# Patient Record
Sex: FEMALE | Race: ASIAN | NOT HISPANIC OR LATINO | Employment: FULL TIME | URBAN - METROPOLITAN AREA
[De-identification: names, ages, dates, MRNs, and addresses within clinical notes are randomized per-mention and may not be internally consistent; named-entity substitution may affect disease eponyms.]

---

## 2017-02-09 ENCOUNTER — PRE VISIT (OUTPATIENT)
Dept: ORTHOPEDICS | Facility: CLINIC | Age: 57
End: 2017-02-09

## 2017-02-09 NOTE — TELEPHONE ENCOUNTER
1.  Date/reason for appt: 2/17/17 -- bilateral numbing in feet  2.  Referring provider: Kasey Stanton  3.  Call to patient (Yes / No - short description): no, referred  4.  Previous care at / records requested from: [facility] -- faxed records request.  5.  Other: [facility] -- pt has seen Dr. Fonseca previously, last seen 6/10/15.

## 2017-02-17 ENCOUNTER — OFFICE VISIT (OUTPATIENT)
Dept: ORTHOPEDICS | Facility: CLINIC | Age: 57
End: 2017-02-17

## 2017-02-17 VITALS — WEIGHT: 104.2 LBS | HEIGHT: 62 IN | BODY MASS INDEX: 19.17 KG/M2

## 2017-02-17 DIAGNOSIS — E11.628 TYPE 2 DIABETES MELLITUS WITH OTHER SKIN COMPLICATION, WITHOUT LONG-TERM CURRENT USE OF INSULIN (H): Primary | ICD-10-CM

## 2017-02-17 DIAGNOSIS — I73.9 PVD (PERIPHERAL VASCULAR DISEASE) (H): ICD-10-CM

## 2017-02-17 DIAGNOSIS — L84 TYLOMA: ICD-10-CM

## 2017-02-17 DIAGNOSIS — B35.1 DERMATOPHYTOSIS OF NAIL: ICD-10-CM

## 2017-02-17 DIAGNOSIS — M20.42 HAMMER TOES OF BOTH FEET: ICD-10-CM

## 2017-02-17 DIAGNOSIS — M20.41 HAMMER TOES OF BOTH FEET: ICD-10-CM

## 2017-02-17 DIAGNOSIS — Q66.6 PES PLANOVALGUS: ICD-10-CM

## 2017-02-17 NOTE — MR AVS SNAPSHOT
After Visit Summary   2/17/2017    Lucia Oliver    MRN: [id number]           Patient Information     Date Of Birth          1960        Visit Information        Provider Department      2/17/2017 2:25 PM Rogers Ivory Aaron Daniel, DPM [facility]         Follow-ups after your visit        Your next 10 appointments already scheduled     Apr 03, 2017  3:15 PM CDT   RETURN GENERAL with Mary Collazo MD   Eye Clinic ([facility])    [facility]  [address]  [address] [address]  [facility] [address] [phone]            Jun 20, 2017  4:20 PM CDT   (Arrive by 4:05 PM)   RETURN FOOT/ANKLE with RONEY Silver [facility] ([facility])    [address]  [address] [facility] [id number] [phone]              Who to contact     Please call your clinic at [phone] to:    Ask questions about your health    Make or cancel appointments    Discuss your medicines    Learn about your test results    Speak to your doctor   If you have compliments or concerns about an experience at your clinic, or if you wish to file a complaint, please contact [facility] Patient Relations at [phone] or email us at [email or web address]         Additional Information About Your Visit        MyChart Information     Efficient Front[employer] is an electronic gateway that provides easy, online access to your medical records. With MyAGENT, you can request a clinic appointment, read your test results, renew a prescription or communicate with your care team.     To sign up for Efficient [employer] visit the website at [email or web address]   You will be asked to enter the access code listed below, as well as some personal information. Please follow the directions to create your username and password.     Your access code is: [email or web address]  Expires: 5/11/2017  6:30 AM     Your access code will  " in 90 days. If you need help or a new code, please contact your [facility] or call [phone] for assistance.        Care EveryWhere ID     This is your Care EveryWhere ID. This could be used by other organizations to access your [place] medical records  [id number]        Your Vitals Were     Height BMI (Body Mass Index)                1.575 m (5' 2\") 19.06 kg/m2           Blood Pressure from Last 3 Encounters:   14 112/72   14 116/76   14 112/68    Weight from Last 3 Encounters:   17 47.3 kg (104 lb 3.2 oz)   06/10/15 48.1 kg (106 lb)   14 47.6 kg (105 lb)              Today, you had the following     No orders found for display       Primary Care Provider Office Phone # Fax #    Daniela Fishgretaharry [phone]       [facility]  [facility] [address]        Thank you!     Thank you for choosing [facility]  for your care. Our goal is always to provide you with excellent care. Hearing back from our patients is one way we can continue to improve our services. Please take a few minutes to complete the written survey that you may receive in the mail after your visit with us. Thank you!             Your Updated Medication List - Protect others around you: Learn how to safely use, store and throw away your medicines at www.disposemymeds.org.          This list is accurate as of: 17  3:28 PM.  Always use your most recent med list.                   Brand Name Dispense Instructions for use    econazole nitrate 1 % cream     85 g    Apply  topically 2 times daily.       Efinaconazole 10 % Soln     8 mL    Externally apply topically daily To toenails daily.       FOSAMAX 70 MG tablet   Generic drug:  alendronate      Take 70 mg by mouth every 7 days       glyBURIDE 5 MG tablet    DIABETA /MICRONASE     Take 5 mg by mouth daily (with breakfast).       lisinopril 20 MG tablet    PRINIVIL/ZESTRIL     Take " 20 mg by mouth daily.       MAXAIR AUTOHALER 200 MCG/INH Inhaler   Generic drug:  pirbuterol      Inhale 2 puffs into the lungs 4 times daily.       QVAR 80 MCG/ACT Inhaler   Generic drug:  beclomethasone      Inhale 1 puff into the lungs 2 times daily.       simvastatin 20 MG tablet    ZOCOR     Take  by mouth At Bedtime.

## 2017-02-17 NOTE — LETTER
2/17/2017       RE: Lucia Oliver  [address]  [place] [address]     Dear Colleague,    Thank you for referring your patient, Lucia Oliver, to the [facility] at [facility]. Please see a copy of my visit note below.    Date of Service: 2/17/2017    Chief Complaint:   Chief Complaint   Patient presents with     Foot Problems     bilateral feet numbness and c/o callous formation        HPI: Lucia is a 56 year old female who presents today for further evaluation of calluses on both of her feet, thickened toenails, and new diabetic shoes. She relates that the calluses on both of her feet are painful when she is standing at work. She relates that the nails are very thick. She has been using vinegar soaks everyday to control the thickness. She would also like a new pair of diabetic shoes. Her last pair was made in 2015. She does relates that she does sometimes get numbness on the sides of her feet that radiate up her leg. She notes that this decreases when she is in her diabetic shoes. She presents today with an . Pt was seen by PMD 2/8/17 at [facility]    Review of Systems: No N/V/D/F/C/SOB/CP  Answers for HPI/ROS submitted by the patient on 2/17/2017   General Symptoms: No  Skin Symptoms: No      PMH:   Past Medical History   Diagnosis Date     Asthma      Cataract      Diabetes mellitus type II      Essential hypertension, benign      High cholesterol      Numbness in both hands      Unspecified asthma(493.90)        PSxH: No past surgical history on file.    Allergies: Advil [ibuprofen]    SH:   Social History     Social History     Marital status:      Spouse name: N/A     Number of children: N/A     Years of education: N/A     Occupational History     Not on file.     Social History Main Topics     Smoking status: Never Smoker     Smokeless tobacco: Never Used     Alcohol use Not on file     Drug use: Not on file     Sexual activity: Not on file  "    Other Topics Concern     Not on file     Social History Narrative       FH:   Family History   Problem Relation Age of Onset     Glaucoma No family hx of      Macular Degeneration No family hx of        Objective:  Data Unavailable Data Unavailable Data Unavailable Data Unavailable 5' 2\" 104 lbs 3.2 oz     Lab Results   Component Value Date    A1C 5.6 09/26/2005    A1C 6.3 04/20/2005         PT and DP pulses are 1/4 bilaterally. CRT is >3 seconds. Diminished pedal hair.   Gross sensation is intact bilaterally. Protective sensation is intact as demonstrated with the [place]  Touch test. Paresthesia not reproducible today. No Tinel's sign.   Equinus is noted bilaterally. No pain with active or passive ROM of the ankle, MTJ, 1st ray, or halluces bilaterally. Pes planus BL. Hammertoes BL  Nails thickened, yellow, brittle, with subungual debris bilaterally at the halluces, 2nd, and 5th digits. Remaining nails are dystrophic. No open lesions are noted.     Assessment: DM2  PVD  Onychomycosis x 6. Dystrophic nails x 4  Tyloma  Paresthesia      Plan:  - Pt seen and evaluated. Unclear etiology of the paresthesia. Could be coming from back as she relates it radiates up the leg. She declined referral to back ortho today.   - Nails were debrided x 6.   - Tylomas debrided x 2  - Rx for diabetic shoes was written. She will call to make appointment.   - See again in 4 months.    Again, thank you for allowing me to participate in the care of your patient.      Sincerely,    Dimitri aSnchez, RONEY      "

## 2017-02-17 NOTE — PROGRESS NOTES
Date of Service: 2/17/2017    Chief Complaint:   Chief Complaint   Patient presents with     Foot Problems     bilateral feet numbness and c/o callous formation        HPI: Lucia is a 56 year old female who presents today for further evaluation of calluses on both of her feet, thickened toenails, and new diabetic shoes. She relates that the calluses on both of her feet are painful when she is standing at work. She relates that the nails are very thick. She has been using vinegar soaks everyday to control the thickness. She would also like a new pair of diabetic shoes. Her last pair was made in 2015. She does relates that she does sometimes get numbness on the sides of her feet that radiate up her leg. She notes that this decreases when she is in her diabetic shoes. She presents today with an . Pt was seen by PMD 2/8/17 at [facility]    Review of Systems: No N/V/D/F/C/SOB/CP  Answers for HPI/ROS submitted by the patient on 2/17/2017   General Symptoms: No  Skin Symptoms: No      PMH:   Past Medical History   Diagnosis Date     Asthma      Cataract      Diabetes mellitus type II      Essential hypertension, benign      High cholesterol      Numbness in both hands      Unspecified asthma(493.90)        PSxH: No past surgical history on file.    Allergies: Advil [ibuprofen]    SH:   Social History     Social History     Marital status:      Spouse name: N/A     Number of children: N/A     Years of education: N/A     Occupational History     Not on file.     Social History Main Topics     Smoking status: Never Smoker     Smokeless tobacco: Never Used     Alcohol use Not on file     Drug use: Not on file     Sexual activity: Not on file     Other Topics Concern     Not on file     Social History Narrative       FH:   Family History   Problem Relation Age of Onset     Glaucoma No family hx of      Macular Degeneration No family hx of        Objective:  Data Unavailable Data Unavailable Data Unavailable Data  "Unavailable 5' 2\" 104 lbs 3.2 oz     Lab Results   Component Value Date    A1C 5.6 09/26/2005    A1C 6.3 04/20/2005         PT and DP pulses are 1/4 bilaterally. CRT is >3 seconds. Diminished pedal hair.   Gross sensation is intact bilaterally. Protective sensation is intact as demonstrated with the [place]  Touch test. Paresthesia not reproducible today. No Tinel's sign.   Equinus is noted bilaterally. No pain with active or passive ROM of the ankle, MTJ, 1st ray, or halluces bilaterally. Pes planus BL. Hammertoes BL  Nails thickened, yellow, brittle, with subungual debris bilaterally at the halluces, 2nd, and 5th digits. Remaining nails are dystrophic. No open lesions are noted.     Assessment: DM2  PVD  Onychomycosis x 6. Dystrophic nails x 4  Tyloma  Paresthesia      Plan:  - Pt seen and evaluated. Unclear etiology of the paresthesia. Could be coming from back as she relates it radiates up the leg. She declined referral to back ortho today.   - Nails were debrided x 6.   - Tylomas debrided x 2  - Rx for diabetic shoes was written. She will call to make appointment.   - See again in 4 months.             "

## 2017-02-17 NOTE — NURSING NOTE
"Reason For Visit:   Chief Complaint   Patient presents with     Foot Problems     bilateral feet numbness and c/o callous formation       Primary MD: Daniela Hernandez  Ref. MD: Kasey Stanton    ?  Yes, specify language: Mizzen+Main  Occupation [employer].  Currently working? Yes.  Work status?  Full time.  Date of injury: none  Date of surgery: none  Smoker: No      Ht 1.575 m (5' 2\")  Wt 47.3 kg (104 lb 3.2 oz)  BMI 19.06 kg/m2    Pain Assessment  Patient Currently in Pain: No      "

## 2017-04-03 ENCOUNTER — OFFICE VISIT (OUTPATIENT)
Dept: OPHTHALMOLOGY | Facility: CLINIC | Age: 57
End: 2017-04-03
Attending: OPHTHALMOLOGY
Payer: COMMERCIAL

## 2017-04-03 DIAGNOSIS — H25.13 SENILE NUCLEAR SCLEROSIS, BILATERAL: ICD-10-CM

## 2017-04-03 DIAGNOSIS — H52.13 MYOPIA WITH ASTIGMATISM AND PRESBYOPIA, BILATERAL: ICD-10-CM

## 2017-04-03 DIAGNOSIS — E11.9 TYPE 2 DIABETES MELLITUS WITHOUT COMPLICATION, WITHOUT LONG-TERM CURRENT USE OF INSULIN (H): Primary | ICD-10-CM

## 2017-04-03 DIAGNOSIS — H52.203 MYOPIA WITH ASTIGMATISM AND PRESBYOPIA, BILATERAL: ICD-10-CM

## 2017-04-03 DIAGNOSIS — H52.4 MYOPIA WITH ASTIGMATISM AND PRESBYOPIA, BILATERAL: ICD-10-CM

## 2017-04-03 DIAGNOSIS — H11.132: ICD-10-CM

## 2017-04-03 PROCEDURE — 99213 OFFICE O/P EST LOW 20 MIN: CPT | Mod: ZF

## 2017-04-03 PROCEDURE — 92015 DETERMINE REFRACTIVE STATE: CPT | Mod: ZF

## 2017-04-03 PROCEDURE — 92285 EXTERNAL OCULAR PHOTOGRAPHY: CPT | Mod: ZF | Performed by: OPHTHALMOLOGY

## 2017-04-03 ASSESSMENT — REFRACTION_MANIFEST
OD_AXIS: 160
OD_ADD: +1.75
OD_SPHERE: -0.25
OS_ADD: +1.75
OD_CYLINDER: +0.25
OS_SPHERE: -1.00
OS_CYLINDER: +1.00
OS_AXIS: 165

## 2017-04-03 ASSESSMENT — EXTERNAL EXAM - RIGHT EYE: OD_EXAM: NORMAL

## 2017-04-03 ASSESSMENT — CONF VISUAL FIELD
OD_NORMAL: 1
OS_NORMAL: 1
METHOD: COUNTING FINGERS

## 2017-04-03 ASSESSMENT — CUP TO DISC RATIO
OD_RATIO: 0.4
OS_RATIO: 0.4

## 2017-04-03 ASSESSMENT — TONOMETRY
OD_IOP_MMHG: 14
IOP_METHOD: TONOPEN
OS_IOP_MMHG: 15

## 2017-04-03 ASSESSMENT — VISUAL ACUITY
OD_CC: 20/25
OS_CC: 20/25
METHOD: SNELLEN - LINEAR
OS_CC+: -1

## 2017-04-03 ASSESSMENT — SLIT LAMP EXAM - LIDS
COMMENTS: NORMAL
COMMENTS: NORMAL

## 2017-04-03 ASSESSMENT — EXTERNAL EXAM - LEFT EYE: OS_EXAM: NORMAL

## 2017-04-03 NOTE — NURSING NOTE
Chief Complaints and History of Present Illnesses   Patient presents with     Diabetic Eye Exam     s/p MD Type 2 diabetes mellitus without complication (HCC) -       HPI    Affected eye(s):  Both   Symptoms:     Decreased vision   No floaters   No flashes   No halos   No starbursts   No tearing   No Dryness      Duration:  12 months   Frequency:  Constant       Do you have eye pain now?:  No      Comments:  Pt. stated a decrease in near vision over the last 4 months.   BS:130. She does not check daily.  A1C 5.9 03/2015 per pt            A1C                      5.6                 09/26/2005                 A1C                      6.3                 04/20/2005              Joaquim Sharp  3:14 PM April 3, 2017

## 2017-04-03 NOTE — PROGRESS NOTES
HPI  Lucia Oliver is a 56year old female here for diabetic eye exam. She states her vision is decreased at near in both eyes for the last year or so. Her distance vision is good without glasses. No eye pain, redness, discharge. No flashes/floaters. Per patient, her last Hgb A1C 5.9     Assessment & Plan      (E11.9) Type 2 diabetes mellitus without complication (HCC) - Both Eyes  (primary encounter diagnosis)  Comment: On glyburide. Patient reported A1c 5.9% few weeks ago. No diabetic retinopathy on exam today.  Plan: Discussed the importance of tight blood glucose control in the prevention of diabetic retinopathy. Recommend yearly dilated eye exam.    (H25.13) Senile nuclear sclerosis, bilateral - Both Eyes  Comment: Mild, good best corrected visual acuity with updated manifest refraction.   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       -----------------------------------------------------------------------------------    Patient disposition:   Return in about 1 year (around 4/3/2018). or sooner as needed.      Helio Stewart MD  PGY2, Dept of Ophthalmology  Pager [phone]    Teaching statement:  Complete documentation of historical and exam elements from today's encounter can be found in the full encounter summary report (not reduplicated in this progress note). I personally obtained the chief complaint(s) and history of present illness.  I confirmed and edited as necessary the review of systems, past medical/surgical history, family history, social history, and examination findings as documented by others; and I examined the patient myself. I personally reviewed the relevant tests, images, and reports as documented  above.     I formulated and edited as necessary the assessment and plan and discussed the findings and management plan with the patient and family.    Mary Collazo MD  Comprehensive Ophthalmology & Ocular Pathology  Department of Ophthalmology and Visual Neurosciences  [email or web address]  Pager [phone]

## 2017-04-03 NOTE — MR AVS SNAPSHOT
After Visit Summary   4/3/2017    Lucia Oliver    MRN: [id number]           Patient Information     Date Of Birth          1960        Visit Information        Provider Department      4/3/2017 3:00 PM Mary Collazo MD; [facility]        Today's Diagnoses     Type 2 diabetes mellitus without complication, without long-term current use of insulin (H)    -  1    Senile nuclear sclerosis, bilateral - Both Eyes        Myopia with astigmatism and presbyopia, bilateral - Both Eyes        Conjunctival pigmentations, left           Follow-ups after your visit        Follow-up notes from your care team     Return in about 1 year (around 4/3/2018).      Your next 10 appointments already scheduled     Jun 20, 2017  4:20 PM CDT   (Arrive by 4:05 PM)   RETURN FOOT/ANKLE with Dimitri Sanchez DPM   [facility] ([facility])    [address] [place] [address] [phone]              Who to contact     Please call your clinic at [phone] to:    Ask questions about your health    Make or cancel appointments    Discuss your medicines    Learn about your test results    Speak to your doctor   If you have compliments or concerns about an experience at your clinic, or if you wish to file a complaint, please contact [facility] Patient Relations at [phone] or email us at [email or web address]         Additional Information About Your Visit        MyChart Information     Way2Payt is an electronic gateway that provides easy, online access to your medical records. With [employer], you can request a clinic appointment, read your test results, renew a prescription or communicate with your care team.     To sign up for Way2Payt visit the website at [email or web address]   You will be asked to enter the access code listed below, as well as some personal information. Please  follow the directions to create your username and password.     Your access code is: [email or web address]  Expires: 2017  7:30 AM     Your access code will  in 90 days. If you need help or a new code, please contact your [facility] or call [phone] for assistance.        Care EveryWhere ID     This is your Care EveryWhere ID. This could be used by other organizations to access your [place] medical records  [id number]         Blood Pressure from Last 3 Encounters:   14 112/72   14 116/76   14 112/68    Weight from Last 3 Encounters:   17 47.3 kg (104 lb 3.2 oz)   06/10/15 48.1 kg (106 lb)   14 47.6 kg (105 lb)              We Performed the Following     Slit Lamp Photos OS (left eye)        Primary Care Provider Office Phone # Fax #    Daniela TITA Hernandez [phone]       [facility]  [facility] [address]        Thank you!     Thank you for choosing EYE CLINIC  for your care. Our goal is always to provide you with excellent care. Hearing back from our patients is one way we can continue to improve our services. Please take a few minutes to complete the written survey that you may receive in the mail after your visit with us. Thank you!             Your Updated Medication List - Protect others around you: Learn how to safely use, store and throw away your medicines at www.disposemymeds.org.          This list is accurate as of: 4/3/17  4:45 PM.  Always use your most recent med list.                   Brand Name Dispense Instructions for use    econazole nitrate 1 % cream     85 g    Apply  topically 2 times daily.       Efinaconazole 10 % Soln     8 mL    Externally apply topically daily To toenails daily.       FOSAMAX 70 MG tablet   Generic drug:  alendronate      Take 70 mg by mouth every 7 days       glyBURIDE 5 MG tablet    DIABETA /MICRONASE     Take 5 mg by mouth daily (with breakfast).       lisinopril 20 MG  tablet    PRINIVIL/ZESTRIL     Take 20 mg by mouth daily.       MAXAIR AUTOHALER 200 MCG/INH Inhaler   Generic drug:  pirbuterol      Inhale 2 puffs into the lungs 4 times daily.       QVAR 80 MCG/ACT Inhaler   Generic drug:  beclomethasone      Inhale 1 puff into the lungs 2 times daily.       simvastatin 20 MG tablet    ZOCOR     Take  by mouth At Bedtime.

## 2017-09-20 ENCOUNTER — MEDICAL CORRESPONDENCE (OUTPATIENT)
Dept: HEALTH INFORMATION MANAGEMENT | Facility: CLINIC | Age: 57
End: 2017-09-20

## 2018-03-19 ENCOUNTER — MEDICAL CORRESPONDENCE (OUTPATIENT)
Dept: HEALTH INFORMATION MANAGEMENT | Facility: CLINIC | Age: 58
End: 2018-03-19

## 2018-04-09 ENCOUNTER — OFFICE VISIT (OUTPATIENT)
Dept: OPHTHALMOLOGY | Facility: CLINIC | Age: 58
End: 2018-04-09
Attending: OPHTHALMOLOGY
Payer: COMMERCIAL

## 2018-04-09 DIAGNOSIS — H52.203 MYOPIA WITH ASTIGMATISM AND PRESBYOPIA, BILATERAL: ICD-10-CM

## 2018-04-09 DIAGNOSIS — H52.13 MYOPIA WITH ASTIGMATISM AND PRESBYOPIA, BILATERAL: ICD-10-CM

## 2018-04-09 DIAGNOSIS — H25.13 NUCLEAR SCLEROTIC CATARACT OF BOTH EYES: ICD-10-CM

## 2018-04-09 DIAGNOSIS — H11.132: ICD-10-CM

## 2018-04-09 DIAGNOSIS — H52.4 MYOPIA WITH ASTIGMATISM AND PRESBYOPIA, BILATERAL: ICD-10-CM

## 2018-04-09 DIAGNOSIS — E11.9 DIABETES MELLITUS TYPE 2 WITHOUT RETINOPATHY (H): Primary | ICD-10-CM

## 2018-04-09 PROCEDURE — 92015 DETERMINE REFRACTIVE STATE: CPT | Mod: ZF

## 2018-04-09 PROCEDURE — G0463 HOSPITAL OUTPT CLINIC VISIT: HCPCS | Mod: ZF

## 2018-04-09 ASSESSMENT — REFRACTION_MANIFEST
OD_ADD: +2.50
OD_AXIS: 175
OD_CYLINDER: +0.50
OS_ADD: +2.50
OS_AXIS: 173
OS_SPHERE: -0.75
OD_SPHERE: -0.25
OS_CYLINDER: +1.25

## 2018-04-09 ASSESSMENT — CUP TO DISC RATIO
OS_RATIO: 0.4
OD_RATIO: 0.4

## 2018-04-09 ASSESSMENT — TONOMETRY
OS_IOP_MMHG: 12
IOP_METHOD: ICARE
OD_IOP_MMHG: 13

## 2018-04-09 ASSESSMENT — EXTERNAL EXAM - LEFT EYE: OS_EXAM: NORMAL

## 2018-04-09 ASSESSMENT — VISUAL ACUITY
OD_SC: 20/25
METHOD: SNELLEN - LINEAR
OD_SC+: +1
OS_SC: 20/40
OS_SC+: -1

## 2018-04-09 ASSESSMENT — CONF VISUAL FIELD
OD_NORMAL: 1
OS_NORMAL: 1
METHOD: COUNTING FINGERS

## 2018-04-09 ASSESSMENT — SLIT LAMP EXAM - LIDS
COMMENTS: NORMAL
COMMENTS: NORMAL

## 2018-04-09 ASSESSMENT — EXTERNAL EXAM - RIGHT EYE: OD_EXAM: NORMAL

## 2018-04-09 NOTE — NURSING NOTE
Chief Complaints and History of Present Illnesses   Patient presents with     Eye Exam For Diabetes     Annual diabetic eye exam      HPI    Last Eye Exam:  4/3/17   Affected eye(s):  Both   Symptoms:     No floaters   No flashes   No redness   No tearing   No Dryness         Do you have eye pain now?:  Yes   Location:  OS   Pain Level:  Mild Pain (3)   Pain Duration:  1 month   Pain Frequency:  Constant   Pain Characteristics:  Aching      Comments:  Pt says vision is the same as last visit. Pt notes eye pain on upper LE by the eyebrow, described as a constant aching   DM type 2, blood sugar not measured regularly but pt notes last time checked was 130 around 2 weeks ago   Lab Results       Component                Value               Date    A1C 7.01 03/26/18                     A1C                      5.6                 09/26/2005                 A1C                      6.3                 04/20/2005              Meghan Rodriguez April 9, 2018 3:31 PM   Joaquim Sharp  4:17 PM April 9, 2018

## 2018-04-09 NOTE — PROGRESS NOTES
HPI  Lucia Oliver is a 57 year old female here for diabetic eye exam. She states her vision in the left eye seems a bit weaker than in the right eye. No eye pain, redness, discharge. No flashes/floaters.    Assessment & Plan      (E11.9) Type 2 diabetes mellitus without complication (HCC) - Both Eyes  (primary encounter diagnosis)  Comment: On glyburide. No recent A1c in EPIC. No diabetic retinopathy on exam today.  Plan: Discussed the importance of tight blood glucose control in the prevention of diabetic retinopathy. Recommend yearly dilated eye exam.    (H25.13) Senile nuclear sclerosis, bilateral - Both Eyes  Comment: Mild, good best corrected visual acuity with updated manifest refraction.   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       -----------------------------------------------------------------------------------    Patient disposition:   Return in about 1 year (around 4/9/2019). or sooner as needed.      Teaching statement:  Complete documentation of historical and exam elements from today's encounter can be found in the full encounter summary report (not reduplicated in this progress note). I personally obtained the chief complaint(s) and history of present illness.  I confirmed and edited as necessary the review of systems, past medical/surgical history, family history, social history, and examination findings as documented by others; and I examined the patient myself. I personally reviewed the relevant tests, images, and reports as documented above.     I formulated and edited as necessary the assessment and plan and discussed the findings and management plan with the patient and family.    Mary Collazo,  MD  Comprehensive Ophthalmology & Ocular Pathology  Department of Ophthalmology and Visual Neurosciences  [email or web address]  Pager [phone]

## 2018-04-09 NOTE — MR AVS SNAPSHOT
After Visit Summary   2018    Lucia Oliver    MRN: [id number]           Patient Information     Date Of Birth          1960        Visit Information        Provider Department      2018 3:00 PM Rogers Ivory Amanda C, MD [facility]        Today's Diagnoses     Diabetes mellitus type 2 without retinopathy (H)    -  1    Nuclear sclerotic cataract of both eyes        Conjunctival pigmentations, left        Myopia with astigmatism and presbyopia, bilateral - Both Eyes           Follow-ups after your visit        Follow-up notes from your care team     Return in about 1 year (around 2019).      Your next 10 appointments already scheduled     2018  6:20 PM CDT   (Arrive by 6:05 PM)   RETURN FOOT/ANKLE with Dimitri Sanchez DPM   [facility] ([facility])    [address] [place] [address] [phone]              Who to contact     Please call your clinic at [phone] to:    Ask questions about your health    Make or cancel appointments    Discuss your medicines    Learn about your test results    Speak to your doctor            Additional Information About Your Visit        MyChart Information     Kindara is an electronic gateway that provides easy, online access to your medical records. With Kindara, you can request a clinic appointment, read your test results, renew a prescription or communicate with your care team.     To sign up for Garmentoryt visit the website at [email or web address]   You will be asked to enter the access code listed below, as well as some personal information. Please follow the directions to create your username and password.     Your access code is: [email or web address]  Expires: 2018  6:31 AM     Your access code will  in 90 days. If you need help or a new code, please contact your [facility] or call [phone] for assistance.         Care EveryWhere ID     This is your Care EveryWhere ID. This could be used by other organizations to access your [place] medical records  [id number]         Blood Pressure from Last 3 Encounters:   03/26/14 112/72   01/22/14 116/76   01/08/14 112/68    Weight from Last 3 Encounters:   02/17/17 47.3 kg (104 lb 3.2 oz)   06/10/15 48.1 kg (106 lb)   05/05/14 47.6 kg (105 lb)              Today, you had the following     No orders found for display       Primary Care Provider Office Phone # Fax #    Daniela Hernandez [phone]       [facility] [address] [facility] [address]        Equal Access to Services     ALCON CULP : Hadii aad ku hadasho Soomaali, waaxda luqadaha, qaybta kaalmada adeegyada, waxay idiin haynick jon. So [facility] [phone].    ATENCIÓN: Si habla español, tiene a parrish disposición servicios gratuitos de asistencia lingüística. [facility] [phone].    We comply with applicable federal civil rights laws and [place] laws. We do not discriminate on the basis of race, color, national origin, age, disability, sex, sexual orientation, or gender identity.            Thank you!     Thank you for choosing EYE CLINIC  for your care. Our goal is always to provide you with excellent care. Hearing back from our patients is one way we can continue to improve our services. Please take a few minutes to complete the written survey that you may receive in the mail after your visit with us. Thank you!             Your Updated Medication List - Protect others around you: Learn how to safely use, store and throw away your medicines at www.disposemymeds.org.          This list is accurate as of 4/9/18  5:20 PM.  Always use your most recent med list.                   Brand Name Dispense Instructions for use Diagnosis    econazole nitrate 1 % cream     85 g    Apply  topically 2 times daily.    Dermatophytosis of nail       Efinaconazole 10 % Soln     8 mL    Externally apply  topically daily To toenails daily.    Dermatophytosis of nail, Type II or unspecified type diabetes mellitus without mention of complication, not stated as uncontrolled       FOSAMAX 70 MG tablet   Generic drug:  alendronate      Take 70 mg by mouth every 7 days        glyBURIDE 5 MG tablet    DIABETA /MICRONASE     Take 5 mg by mouth daily (with breakfast).        lisinopril 20 MG tablet    PRINIVIL/ZESTRIL     Take 20 mg by mouth daily.        MAXAIR AUTOHALER 200 MCG/INH Inhaler   Generic drug:  pirbuterol      Inhale 2 puffs into the lungs 4 times daily.        QVAR 80 MCG/ACT Inhaler   Generic drug:  beclomethasone      Inhale 1 puff into the lungs 2 times daily.        simvastatin 20 MG tablet    ZOCOR     Take  by mouth At Bedtime.

## 2018-04-24 ENCOUNTER — OFFICE VISIT (OUTPATIENT)
Dept: ORTHOPEDICS | Facility: CLINIC | Age: 58
End: 2018-04-24
Payer: COMMERCIAL

## 2018-04-24 DIAGNOSIS — M54.50 BILATERAL LOW BACK PAIN WITHOUT SCIATICA, UNSPECIFIED CHRONICITY: ICD-10-CM

## 2018-04-24 DIAGNOSIS — G62.9 PERIPHERAL POLYNEUROPATHY: Primary | ICD-10-CM

## 2018-04-24 DIAGNOSIS — M54.2 CERVICALGIA: ICD-10-CM

## 2018-04-24 DIAGNOSIS — E11.42 TYPE 2 DIABETES MELLITUS WITH DIABETIC POLYNEUROPATHY, WITHOUT LONG-TERM CURRENT USE OF INSULIN (H): ICD-10-CM

## 2018-04-24 DIAGNOSIS — B35.1 DERMATOPHYTOSIS OF NAIL: ICD-10-CM

## 2018-04-24 NOTE — NURSING NOTE
Reason For Visit:   Chief Complaint   Patient presents with     Consult     Type 2 Diabetic. Numbness, bilateral feet. Discolored and thick toenails.          : Yes, Bulgarian.      Pain Assessment  Patient Currently in Pain: Yes  Primary Pain Location: Foot  Pain Orientation: Right, Left  Pain Descriptors: Numbness               Current Outpatient Prescriptions   Medication Sig Dispense Refill     alendronate (FOSAMAX) 70 MG tablet Take 70 mg by mouth every 7 days       beclomethasone (QVAR) 80 MCG/ACT Inhaler Inhale 1 puff into the lungs 2 times daily.       econazole nitrate 1 % cream Apply  topically 2 times daily. 85 g 3     Efinaconazole 10 % SOLN Externally apply topically daily To toenails daily. 8 mL 11     glyBURIDE (DIABETA / MICRONASE) 5 MG tablet Take 5 mg by mouth daily (with breakfast).       lisinopril (PRINIVIL,ZESTRIL) 20 MG tablet Take 20 mg by mouth daily.       pirbuterol (MAXAIR AUTOHALER) 200 MCG/INH inhaler Inhale 2 puffs into the lungs 4 times daily.       simvastatin (ZOCOR) 20 MG tablet Take  by mouth At Bedtime.            Allergies   Allergen Reactions     Advil [Ibuprofen] Nausea and Vomiting     Hard on her stomach/digestion

## 2018-04-24 NOTE — MR AVS SNAPSHOT
After Visit Summary   4/24/2018    Lucia Oliver    MRN: [id number]           Patient Information     Date Of Birth          1960        Visit Information        Provider Department      4/24/2018 6:05 PM Dimitri Sanchez DPM; RAMIRO GREWAL [facility]        Today's Diagnoses     Peripheral polyneuropathy    -  1    Cervicalgia        Bilateral low back pain without sciatica, unspecified chronicity           Follow-ups after your visit        Additional Services     ORTHOPEDICS ADULT REFERRAL       Your provider has referred you to: [facility]: Orthopaedic Clinic [facility] [phone]   [email or web address]      Dr. Wallis    Please be aware that coverage of these services is subject to the terms and limitations of your health insurance plan.  Call member services at your health plan with any benefit or coverage questions.      Please bring the following to your appointment:    >>   Any x-rays, CTs or MRIs which have been performed.  Contact the facility where they were done to arrange for  prior to your scheduled appointment.    >>   List of current medications   >>   This referral request   >>   Any documents/labs given to you for this referral                  Your next 10 appointments already scheduled     May 18, 2018  3:00 PM CDT   (Arrive by 2:45 PM)   New Patient Visit with Abundio Wallis MD   [facility] ([facility])    [address] [place] [address] [phone]            Aug 24, 2018  3:20 PM CDT   (Arrive by 3:05 PM)   RETURN FOOT/ANKLE with Dimitri Sanchez DPM   [facility] ([facility])    [address] [place] [address] [phone]              Future tests that were ordered for you today     Open Future Orders        Priority Expected Expires Ordered    XR Lumbar  Spine 2-3 Views* Routine 2018            Who to contact     Please call your clinic at [phone] to:    Ask questions about your health    Make or cancel appointments    Discuss your medicines    Learn about your test results    Speak to your doctor            Additional Information About Your Visit        MyChart Information     Enviance is an electronic gateway that provides easy, online access to your medical records. With Enviance, you can request a clinic appointment, read your test results, renew a prescription or communicate with your care team.     To sign up for Enviance visit the website at [email or web address]   You will be asked to enter the access code listed below, as well as some personal information. Please follow the directions to create your username and password.     Your access code is: [email or web address]  Expires: 2018  6:31 AM     Your access code will  in 90 days. If you need help or a new code, please contact your [facility] or call [phone] for assistance.        Care EveryWhere ID     This is your Care EveryWhere ID. This could be used by other organizations to access your [place] medical records  [id number]         Blood Pressure from Last 3 Encounters:   14 112/72   14 116/76   14 112/68    Weight from Last 3 Encounters:   17 47.3 kg (104 lb 3.2 oz)   06/10/15 48.1 kg (106 lb)   14 47.6 kg (105 lb)              We Performed the Following     ORTHOPEDICS ADULT REFERRAL        Primary Care Provider Office Phone # Fax #    Daniela TITA Hernandez [phone]       [facility] [address]        Equal Access to Services     ALCON CULP AH: Hadii sowmya nguyễn Sobrice, waaxda luqadaha, qaybta kaalmada adeegyada, alisia paytonn naa jon. So [facility] [phone].    ATENCIÓN: Si habla español, tiene a parrish disposición servicios gratuitos de  asistencia lingüística. Leila al [phone].    We comply with applicable federal civil rights laws and [place] laws. We do not discriminate on the basis of race, color, national origin, age, disability, sex, sexual orientation, or gender identity.            Thank you!     Thank you for choosing [facility]  for your care. Our goal is always to provide you with excellent care. Hearing back from our patients is one way we can continue to improve our services. Please take a few minutes to complete the written survey that you may receive in the mail after your visit with us. Thank you!             Your Updated Medication List - Protect others around you: Learn how to safely use, store and throw away your medicines at www.disposemymeds.org.          This list is accurate as of 4/24/18  6:38 PM.  Always use your most recent med list.                   Brand Name Dispense Instructions for use Diagnosis    econazole nitrate 1 % cream     85 g    Apply  topically 2 times daily.    Dermatophytosis of nail       Efinaconazole 10 % Soln     8 mL    Externally apply topically daily To toenails daily.    Dermatophytosis of nail, Type II or unspecified type diabetes mellitus without mention of complication, not stated as uncontrolled       FOSAMAX 70 MG tablet   Generic drug:  alendronate      Take 70 mg by mouth every 7 days        glyBURIDE 5 MG tablet    DIABETA /MICRONASE     Take 5 mg by mouth daily (with breakfast).        lisinopril 20 MG tablet    PRINIVIL/ZESTRIL     Take 20 mg by mouth daily.        MAXAIR AUTOHALER 200 MCG/INH Inhaler   Generic drug:  pirbuterol      Inhale 2 puffs into the lungs 4 times daily.        QVAR 80 MCG/ACT Inhaler   Generic drug:  beclomethasone      Inhale 1 puff into the lungs 2 times daily.        simvastatin 20 MG tablet    ZOCOR     Take  by mouth At Bedtime.

## 2018-04-24 NOTE — LETTER
4/24/2018       RE: Lucia Oliver  [address]  [place] [facility] [place] [address]     Dear Colleague,    Thank you for referring your patient, Lucia Oliver, to the [facility] at [facility]. Please see a copy of my visit note below.    Chief Complaint:   Chief Complaint   Patient presents with     Consult     Type 2 Diabetic. Numbness, bilateral feet. Discolored and thick toenails.           Allergies   Allergen Reactions     Advil [Ibuprofen] Nausea and Vomiting     Hard on her stomach/digestion          Subjective: Lucia is a 57 year old female who presents to the clinic today for a diabetic foot exam and management. She presents today with an . She continues to have tingling and burning, but now on both feet.    Objective  PT and DP pulses are 1/4 bilaterally. CRT is >3 seconds. Diminished pedal hair.   Gross sensation is intact bilaterally. Protective sensation is intact as demonstrated with the [place]  Touch test. Paresthesia not reproducible today. No Tinel's sign.   Equinus is noted bilaterally. No pain with active or passive ROM of the ankle, MTJ, 1st ray, or halluces bilaterally. Pes planus BL. Hammertoes BL  Nails thickened, yellow, brittle, with subungual debris bilaterally at the halluces, 2nd, and 5th digits. Remaining nails are dystrophic. No open lesions are noted.      Assessment: DM2  PVD  Onychomycosis x 6. Dystrophic nails x 4  Paresthesia - lower back etiology vs diabetic neuropathy        Plan:  - Pt seen and evaluated. Unclear etiology of the paresthesia. Could be coming from back as she relates it radiates up the leg. Ordered lumbar xray. Referral to Dr. Wallis as she also has neck pain.    - Nails were debrided x 6.   - See again in 4 months.      Again, thank you for allowing me to participate in the care of your patient.      Sincerely,    Dimitri Sanchez DPM

## 2018-04-25 NOTE — PROGRESS NOTES
Chief Complaint:   Chief Complaint   Patient presents with     Consult     Type 2 Diabetic. Numbness, bilateral feet. Discolored and thick toenails.           Allergies   Allergen Reactions     Advil [Ibuprofen] Nausea and Vomiting     Hard on her stomach/digestion          Subjective: Lucia is a 57 year old female who presents to the clinic today for a diabetic foot exam and management. She presents today with an . She continues to have tingling and burning, but now on both feet.    Objective  PT and DP pulses are 1/4 bilaterally. CRT is >3 seconds. Diminished pedal hair.   Gross sensation is intact bilaterally. Protective sensation is intact as demonstrated with the [place]  Touch test. Paresthesia not reproducible today. No Tinel's sign.   Equinus is noted bilaterally. No pain with active or passive ROM of the ankle, MTJ, 1st ray, or halluces bilaterally. Pes planus BL. Hammertoes BL  Nails thickened, yellow, brittle, with subungual debris bilaterally at the halluces, 2nd, and 5th digits. Remaining nails are dystrophic. No open lesions are noted.      Assessment: DM2  PVD  Onychomycosis x 6. Dystrophic nails x 4  Paresthesia - lower back etiology vs diabetic neuropathy        Plan:  - Pt seen and evaluated. Unclear etiology of the paresthesia. Could be coming from back as she relates it radiates up the leg. Ordered lumbar xray. Referral to Dr. Wallis as she also has neck pain.    - Nails were debrided x 6.   - See again in 4 months.

## 2018-05-14 NOTE — TELEPHONE ENCOUNTER
FUTURE VISIT INFORMATION      FUTURE VISIT INFORMATION:    Date: 5/18/18    Time:     Location: [facility]  REFERRAL INFORMATION:    Referring provider:  Dr. Sanchez     Referring providers clinic:   Orthopedics    Reason for visit/diagnosis  Will need Lumbar x-ray before appt. Bilateral low back pain without sciatica, unspecified. chronicity. Referred by Daniel. All records internal.    RECORDS REQUESTED FROM:       Clinic name Comments Records Status Imaging Status     Dr. Sanchez referring Internal  internal                                   RECORDS STATUS

## 2018-05-18 ENCOUNTER — PRE VISIT (OUTPATIENT)
Dept: ORTHOPEDICS | Facility: CLINIC | Age: 58
End: 2018-05-18

## 2019-03-04 ENCOUNTER — OFFICE VISIT (OUTPATIENT)
Dept: OPHTHALMOLOGY | Facility: CLINIC | Age: 59
End: 2019-03-04
Attending: OPHTHALMOLOGY
Payer: COMMERCIAL

## 2019-03-04 DIAGNOSIS — H52.4 PRESBYOPIA: ICD-10-CM

## 2019-03-04 DIAGNOSIS — H52.13 MYOPIC ASTIGMATISM OF BOTH EYES: ICD-10-CM

## 2019-03-04 DIAGNOSIS — E11.9 DIABETES MELLITUS TYPE 2 WITHOUT RETINOPATHY (H): Primary | ICD-10-CM

## 2019-03-04 DIAGNOSIS — H25.13 NUCLEAR SCLEROTIC CATARACT OF BOTH EYES: ICD-10-CM

## 2019-03-04 DIAGNOSIS — H52.203 MYOPIC ASTIGMATISM OF BOTH EYES: ICD-10-CM

## 2019-03-04 PROCEDURE — G0463 HOSPITAL OUTPT CLINIC VISIT: HCPCS | Mod: 25,ZF

## 2019-03-04 PROCEDURE — 92015 DETERMINE REFRACTIVE STATE: CPT | Mod: ZF

## 2019-03-04 ASSESSMENT — REFRACTION_MANIFEST
OS_AXIS: 168
OS_ADD: +2.50
OS_SPHERE: -0.75
OD_AXIS: 175
OD_SPHERE: -0.25
OD_ADD: +2.50
OS_CYLINDER: +1.25
OD_CYLINDER: +0.50

## 2019-03-04 ASSESSMENT — EXTERNAL EXAM - LEFT EYE: OS_EXAM: NORMAL

## 2019-03-04 ASSESSMENT — SLIT LAMP EXAM - LIDS
COMMENTS: NORMAL
COMMENTS: NORMAL

## 2019-03-04 ASSESSMENT — VISUAL ACUITY
OD_PH_SC: 20/20
METHOD: NUMBERS - LINEAR
OD_SC: 20/30
OS_SC: 20/30

## 2019-03-04 ASSESSMENT — CUP TO DISC RATIO
OD_RATIO: 0.4
OS_RATIO: 0.4

## 2019-03-04 ASSESSMENT — EXTERNAL EXAM - RIGHT EYE: OD_EXAM: NORMAL

## 2019-03-04 ASSESSMENT — CONF VISUAL FIELD
METHOD: COUNTING FINGERS
OD_NORMAL: 1

## 2019-03-04 ASSESSMENT — TONOMETRY
OD_IOP_MMHG: 19
IOP_METHOD: TONOPEN
OS_IOP_MMHG: 18

## 2019-03-04 NOTE — PROGRESS NOTES
HPI  Lucia Oliver is a 58 year old female here for diabetic eye exam. She states her vision has been overall stable in both eyes. No eye pain, redness, discharge. No flashes/floaters.    Assessment & Plan      (E11.9) Type 2 diabetes mellitus without complication (HCC) - Both Eyes  (primary encounter diagnosis)  Comment: On glyburide. No recent A1c in EPIC. No diabetic retinopathy on exam today.  Plan: Discussed the importance of tight blood glucose control in the prevention of diabetic retinopathy. Recommend yearly dilated eye exam.    (H25.13) Senile nuclear sclerosis, bilateral - Both Eyes  Comment: Mild, good best corrected visual acuity with updated manifest refraction.   Plan: Observe    (H11.132) Conjunctival pigmentations, left  Comment: patient states that it has been present for many years (since childhood)  Plan: Stable compared to prior photos    (H52.13,  H52.203) Myopia with astigmatism and presbyopia, bilateral - Both Eyes  Comment: Improved vision with refraction  Plan: Given updated glasses Rx.       -----------------------------------------------------------------------------------    Patient disposition:   Return in about 1 year (around 3/4/2020). or sooner as needed.      Teaching statement:  Complete documentation of historical and exam elements from today's encounter can be found in the full encounter summary report (not reduplicated in this progress note). I personally obtained the chief complaint(s) and history of present illness.  I confirmed and edited as necessary the review of systems, past medical/surgical history, family history, social history, and examination findings as documented by others; and I examined the patient myself. I personally reviewed the relevant tests, images, and reports as documented above.     I formulated and edited as necessary the assessment and plan and discussed the findings and management plan with the patient and family.    Mary Collazo MD  Comprehensive  Ophthalmology & Ocular Pathology  Department of Ophthalmology and Visual Neurosciences  [email or web address]  Pager [phone]

## 2019-04-29 ENCOUNTER — DOCUMENTATION ONLY (OUTPATIENT)
Dept: CARE COORDINATION | Facility: CLINIC | Age: 59
End: 2019-04-29

## 2019-05-08 ENCOUNTER — ANCILLARY PROCEDURE (OUTPATIENT)
Dept: MAMMOGRAPHY | Facility: CLINIC | Age: 59
End: 2019-05-08
Attending: FAMILY MEDICINE
Payer: COMMERCIAL

## 2019-05-08 DIAGNOSIS — Z12.31 VISIT FOR SCREENING MAMMOGRAM: ICD-10-CM

## 2019-05-23 ENCOUNTER — TELEPHONE (OUTPATIENT)
Dept: OPHTHALMOLOGY | Facility: CLINIC | Age: 59
End: 2019-05-23

## 2019-05-23 NOTE — TELEPHONE ENCOUNTER
[phone]    Called around noon today with  and unable to reach    Called at 1630 today with   Spoke to   Offered appt tomorrow/saturday  Pt unable   Needs after 4 pm appt next week  Scheduled with Dr. Modi at [facility] next week  Pt aware of date/time/location and stated would call for any sudden vision changes    Note to Dr. Cecy Carl RN 4:58 PM 05/23/19        [facility] Center    Phone Message    May a detailed message be left on voicemail: yes    Reason for Call: Other: [facility] called to scheule the appt for the pt for floaters over the past 2 wks in the L visual field, diabetic eye exam, and otherwise no visual changes. Please call w/ re: floaters. Thanks.     Action Taken: Message routed to:  [facility] ([facility]

## 2019-05-24 ENCOUNTER — TELEPHONE (OUTPATIENT)
Dept: OPHTHALMOLOGY | Facility: CLINIC | Age: 59
End: 2019-05-24

## 2019-06-26 NOTE — PROGRESS NOTES
Chief Complaint   Patient presents with     RECHECK     diabetic foot care            Allergies   Allergen Reactions     Advil [Ibuprofen] Nausea and Vomiting     Hard on her stomach/digestion          Subjective: Lucia is a 58 year old female who presents to the clinic today for a diabetic foot exam and management. She relates that she still does have neuropathic symptoms. Last seen 4/24/18. She did not follow up with Dr. Wallis. She is not having back pain anymore. Relates that she gets intermittent numbness to her feet. It does not interfere with her ADLs. She is ready for new shoes.     Objective  PT and DP pulses are 1/4 bilaterally. CRT is >3 seconds. Diminished pedal hair.   Gross sensation is intact bilaterally. Protective sensation is intact as demonstrated with the [place]  Touch test. Paresthesia not reproducible today. No Tinel's sign.   Equinus is noted bilaterally. No pain with active or passive ROM of the ankle, MTJ, 1st ray, or halluces bilaterally. Pes planus BL. Hammertoes BL  Nails thickened, yellow, brittle, with subungual debris bilaterally at the halluces, 2nd, and 5th digits. Remaining nails are dystrophic. No open lesions are noted. Porokeratosis to the right plantar 5th met head.      Assessment: DM2  PVD  Onychomycosis x 6. Dystrophic nails x 4  Paresthesia - likely diabetic neuropathy  Pes planus with hammertoes.         Plan:  - Pt seen and evaluated.   - Rx for urea for the tyloma and for diabetic shoes.   - Nails were debrided x 6.   - Tyloma debrided x 1.   - See again in 4 months.

## 2019-06-28 ENCOUNTER — OFFICE VISIT (OUTPATIENT)
Dept: ORTHOPEDICS | Facility: CLINIC | Age: 59
End: 2019-06-28
Payer: COMMERCIAL

## 2019-06-28 DIAGNOSIS — B35.1 DERMATOPHYTOSIS OF NAIL: ICD-10-CM

## 2019-06-28 DIAGNOSIS — M20.41 HAMMER TOES OF BOTH FEET: ICD-10-CM

## 2019-06-28 DIAGNOSIS — L84 TYLOMA: Primary | ICD-10-CM

## 2019-06-28 DIAGNOSIS — E11.42 TYPE 2 DIABETES MELLITUS WITH DIABETIC POLYNEUROPATHY, WITHOUT LONG-TERM CURRENT USE OF INSULIN (H): ICD-10-CM

## 2019-06-28 DIAGNOSIS — M20.42 HAMMER TOES OF BOTH FEET: ICD-10-CM

## 2019-06-28 DIAGNOSIS — Q66.6 PES PLANOVALGUS: ICD-10-CM

## 2019-06-28 RX ORDER — UREA 40 %
CREAM (GRAM) TOPICAL DAILY
Qty: 85 G | Refills: 11 | Status: SHIPPED | OUTPATIENT
Start: 2019-06-28 | End: 2023-06-16

## 2019-06-28 NOTE — NURSING NOTE
Reason For Visit:   Chief Complaint   Patient presents with     RECHECK     diabetic foot care       There were no vitals taken for this visit.    Pain Assessment  Patient Currently in Pain: Yes  0-10 Pain Scale: 4  Primary Pain Location: Foot    Anabel Berumen ATC

## 2019-06-28 NOTE — LETTER
6/28/2019       RE: Lucia Oliver  [address]  [place] [address]     Dear Colleague,    Thank you for referring your patient, Lucia Oliver, to the [facility]. Please see a copy of my visit note below.    Chief Complaint   Patient presents with     RECHECK     diabetic foot care       Allergies   Allergen Reactions     Advil [Ibuprofen] Nausea and Vomiting     Hard on her stomach/digestion        Subjective: Lucia is a 58 year old female who presents to the clinic today for a diabetic foot exam and management. She relates that she still does have neuropathic symptoms. Last seen 4/24/18. She did not follow up with Dr. Wallis. She is not having back pain anymore. Relates that she gets intermittent numbness to her feet. It does not interfere with her ADLs. She is ready for new shoes.     Objective  PT and DP pulses are 1/4 bilaterally. CRT is >3 seconds. Diminished pedal hair.   Gross sensation is intact bilaterally. Protective sensation is intact as demonstrated with the [place]  Touch test. Paresthesia not reproducible today. No Tinel's sign.   Equinus is noted bilaterally. No pain with active or passive ROM of the ankle, MTJ, 1st ray, or halluces bilaterally. Pes planus BL. Hammertoes BL  Nails thickened, yellow, brittle, with subungual debris bilaterally at the halluces, 2nd, and 5th digits. Remaining nails are dystrophic. No open lesions are noted. Porokeratosis to the right plantar 5th met head.      Assessment: DM2  PVD  Onychomycosis x 6. Dystrophic nails x 4  Paresthesia - likely diabetic neuropathy  Pes planus with hammertoes.      Plan:  - Pt seen and evaluated.   - Rx for urea for the tyloma and for diabetic shoes.   - Nails were debrided x 6.   - Tyloma debrided x 1.   - See again in 4 months.    Again, thank you for allowing me to participate in the care of your patient.      Sincerely,    Dimitri Sanchez DPM

## 2019-10-01 ENCOUNTER — DOCUMENTATION ONLY (OUTPATIENT)
Dept: CARE COORDINATION | Facility: CLINIC | Age: 59
End: 2019-10-01

## 2019-10-17 ENCOUNTER — MEDICAL CORRESPONDENCE (OUTPATIENT)
Dept: HEALTH INFORMATION MANAGEMENT | Facility: CLINIC | Age: 59
End: 2019-10-17

## 2019-10-17 ENCOUNTER — TRANSFERRED RECORDS (OUTPATIENT)
Dept: HEALTH INFORMATION MANAGEMENT | Facility: CLINIC | Age: 59
End: 2019-10-17

## 2019-10-17 NOTE — TELEPHONE ENCOUNTER
FUTURE VISIT INFORMATION      FUTURE VISIT INFORMATION:    Date: 10/18/19    Time: 7:15AM    Location: [facility]  REFERRAL INFORMATION:    Referring provider:      Referring providers clinic:  [facility]    Reason for visit/diagnosis  right ear injury     RECORDS REQUESTED FROM:       Clinic name Comments Records Status Imaging Status   [facility] 10/17/19 referral and notes  In drawer

## 2019-10-18 ENCOUNTER — PRE VISIT (OUTPATIENT)
Dept: OTOLARYNGOLOGY | Facility: CLINIC | Age: 59
End: 2019-10-18

## 2019-10-18 ENCOUNTER — OFFICE VISIT (OUTPATIENT)
Dept: OTOLARYNGOLOGY | Facility: CLINIC | Age: 59
End: 2019-10-18
Payer: COMMERCIAL

## 2019-10-18 VITALS
HEART RATE: 83 BPM | BODY MASS INDEX: 18.86 KG/M2 | RESPIRATION RATE: 15 BRPM | WEIGHT: 102.5 LBS | OXYGEN SATURATION: 98 % | HEIGHT: 62 IN

## 2019-10-18 DIAGNOSIS — H61.23 BILATERAL IMPACTED CERUMEN: Primary | ICD-10-CM

## 2019-10-18 ASSESSMENT — MIFFLIN-ST. JEOR: SCORE: 996.44

## 2019-10-18 ASSESSMENT — PAIN SCALES - GENERAL: PAINLEVEL: NO PAIN (0)

## 2019-10-18 NOTE — PATIENT INSTRUCTIONS
1.  You were seen in the ENT Clinic today by Dr. Pham.  If you have any questions or concerns after your appointment, please call [phone]. Press option #1 for scheduling related needs. Press option #3 for Nurse advice.    2.  Plan is to return to clinic in 3 months.      Lilian Gimenez LPN  [facility]  [phone]    The patient presents with a history of cerumen impaction and bloody otorrhea.  The patient's ears are cleaned today.  The patient will be seen again in three months to assess her progress.

## 2019-10-18 NOTE — LETTER
10/18/2019       RE: Lucia Oliver  [address]  [facility] [address]     Dear Colleague,    Thank you for referring your patient, Lucia Oliver, to the [facility] EAR NOSE AND THROAT at [facility]. Please see a copy of my visit note below.    The patient presents with a history of cerumen impaction in the ears and bloody otorrhea. She attempted to clean her ear on the right and this resulted in trauma to the ear canal resulting in bloody otorrhea.  The patient does not have difficulty with infections of the ears. The patient denies sinusitis, rhinitis, facial pain, nasal obstruction or purulent nasal discharge. The patient denies chronic or recurrent tonsillitis, chronic or recurrent pharyngitis. The patient denies otalgia, otorrhea, eustachian tube dysfunction, ear infections, dizziness or tinnitus.     This patient is seen in consultation at the request of Dr. Inder Queen.     All other systems were reviewed and they are either negative or they are not directly pertinent to this Otolaryngology examination.      Past Medical History:    Past Medical History:   Diagnosis Date     Asthma      Cataract      Diabetes mellitus type II      Essential hypertension, benign      High cholesterol      Numbness in both hands      Unspecified asthma(493.90)        Past Surgical History:    No past surgical history on file.    Medications:      Current Outpatient Medications:      alendronate (FOSAMAX) 70 MG tablet, Take 70 mg by mouth every 7 days, Disp: , Rfl:      beclomethasone (QVAR) 80 MCG/ACT Inhaler, Inhale 1 puff into the lungs 2 times daily., Disp: , Rfl:      econazole nitrate 1 % cream, Apply  topically 2 times daily., Disp: 85 g, Rfl: 3     Efinaconazole 10 % SOLN, Externally apply topically daily To toenails daily., Disp: 8 mL, Rfl: 11     glyBURIDE (DIABETA / MICRONASE) 5 MG tablet, Take 5 mg by mouth daily (with breakfast)., Disp: , Rfl:      lisinopril (PRINIVIL,ZESTRIL)  20 MG tablet, Take 20 mg by mouth daily., Disp: , Rfl:      pirbuterol (MAXAIR AUTOHALER) 200 MCG/INH inhaler, Inhale 2 puffs into the lungs 4 times daily., Disp: , Rfl:      simvastatin (ZOCOR) 20 MG tablet, Take  by mouth At Bedtime., Disp: , Rfl:      Urea 40 % CREA, Externally apply topically daily, Disp: 85 g, Rfl: 11    Allergies:    Advil [ibuprofen]    Physical Examination:    The patient is a well developed, well nourished female in no apparent distress.  She is normocephalic, atraumatic with pupils equally round and reactive to light.    Oral Cavity Examination:  Normal mucosa with no masses or lesions  Nasal Examination: Normal mucosa with no masses or lesions  Ear Examination: Ear canals impacted with cerumen bilaterally.  There is dried blood filling the lateral ear canal on the right. The ear canals are cleaned today using an alligator forceps, a currette and a suction using a binocular microscope for visualization. The tympanic membranes and middle ear spaces normal bilaterally.  Neurological Examination: Facial nerve function intact and symmetric  Integumentary Examination: No lesions on the skin of the head and neck  Neck Examination: No masses or lesions, no lymphadenopathy  Endocrine Examination: Normal thyroid examination    Assessment and Plan:    The patient presents with a history of cerumen impaction and bloody otorrhea.  The patient's ears are cleaned today.  The patient will be seen again in three months to assess her progress.     CC: Dr. Inder Queen      Again, thank you for allowing me to participate in the care of your patient.      Sincerely,    Donal Pham MD

## 2019-10-18 NOTE — NURSING NOTE
"Chief Complaint   Patient presents with     Consult     right ear injury      Pulse 83   Resp 15   Ht 1.58 m (5' 2.21\")   Wt 46.5 kg (102 lb 8 oz)   SpO2 98%   BMI 18.62 kg/m      Enriqueta Pan CMA      "

## 2019-10-18 NOTE — PROGRESS NOTES
The patient presents with a history of cerumen impaction in the ears and bloody otorrhea. She attempted to clean her ear on the right and this resulted in trauma to the ear canal resulting in bloody otorrhea.  The patient does not have difficulty with infections of the ears. The patient denies sinusitis, rhinitis, facial pain, nasal obstruction or purulent nasal discharge. The patient denies chronic or recurrent tonsillitis, chronic or recurrent pharyngitis. The patient denies otalgia, otorrhea, eustachian tube dysfunction, ear infections, dizziness or tinnitus.     This patient is seen in consultation at the request of Dr. Inder Queen.     All other systems were reviewed and they are either negative or they are not directly pertinent to this Otolaryngology examination.      Past Medical History:    Past Medical History:   Diagnosis Date     Asthma      Cataract      Diabetes mellitus type II      Essential hypertension, benign      High cholesterol      Numbness in both hands      Unspecified asthma(493.90)        Past Surgical History:    No past surgical history on file.    Medications:      Current Outpatient Medications:      alendronate (FOSAMAX) 70 MG tablet, Take 70 mg by mouth every 7 days, Disp: , Rfl:      beclomethasone (QVAR) 80 MCG/ACT Inhaler, Inhale 1 puff into the lungs 2 times daily., Disp: , Rfl:      econazole nitrate 1 % cream, Apply  topically 2 times daily., Disp: 85 g, Rfl: 3     Efinaconazole 10 % SOLN, Externally apply topically daily To toenails daily., Disp: 8 mL, Rfl: 11     glyBURIDE (DIABETA / MICRONASE) 5 MG tablet, Take 5 mg by mouth daily (with breakfast)., Disp: , Rfl:      lisinopril (PRINIVIL,ZESTRIL) 20 MG tablet, Take 20 mg by mouth daily., Disp: , Rfl:      pirbuterol (MAXAIR AUTOHALER) 200 MCG/INH inhaler, Inhale 2 puffs into the lungs 4 times daily., Disp: , Rfl:      simvastatin (ZOCOR) 20 MG tablet, Take  by mouth At Bedtime., Disp: , Rfl:      Urea 40 % CREA, Externally  apply topically daily, Disp: 85 g, Rfl: 11    Allergies:    Advil [ibuprofen]    Physical Examination:    The patient is a well developed, well nourished female in no apparent distress.  She is normocephalic, atraumatic with pupils equally round and reactive to light.    Oral Cavity Examination:  Normal mucosa with no masses or lesions  Nasal Examination: Normal mucosa with no masses or lesions  Ear Examination: Ear canals impacted with cerumen bilaterally.  There is dried blood filling the lateral ear canal on the right. The ear canals are cleaned today using an alligator forceps, a currette and a suction using a binocular microscope for visualization. The tympanic membranes and middle ear spaces normal bilaterally.  Neurological Examination: Facial nerve function intact and symmetric  Integumentary Examination: No lesions on the skin of the head and neck  Neck Examination: No masses or lesions, no lymphadenopathy  Endocrine Examination: Normal thyroid examination    Assessment and Plan:    The patient presents with a history of cerumen impaction and bloody otorrhea.  The patient's ears are cleaned today.  The patient will be seen again in three months to assess her progress.     CC: Dr. Inder Queen

## 2019-11-13 ENCOUNTER — OFFICE VISIT (OUTPATIENT)
Dept: OPHTHALMOLOGY | Facility: CLINIC | Age: 59
End: 2019-11-13
Payer: COMMERCIAL

## 2019-11-13 DIAGNOSIS — H02.88A MEIBOMIAN GLAND DYSFUNCTION (MGD) OF UPPER AND LOWER LIDS OF BOTH EYES: Primary | ICD-10-CM

## 2019-11-13 DIAGNOSIS — H02.88B MEIBOMIAN GLAND DYSFUNCTION (MGD) OF UPPER AND LOWER LIDS OF BOTH EYES: Primary | ICD-10-CM

## 2019-11-13 ASSESSMENT — VISUAL ACUITY
OS_SC: 20/60
OD_SC: 20/40
METHOD: SNELLEN - LINEAR
OS_PH_SC+: +2
OS_PH_SC: 20/40
OD_SC+: -1

## 2019-11-13 ASSESSMENT — SLIT LAMP EXAM - LIDS
COMMENTS: 1+ MGD
COMMENTS: 1+ MGD

## 2019-11-13 ASSESSMENT — TONOMETRY
OD_IOP_MMHG: 14
OS_IOP_MMHG: 13
IOP_METHOD: ICARE

## 2019-11-13 ASSESSMENT — CONF VISUAL FIELD
OS_NORMAL: 1
OD_NORMAL: 1

## 2019-11-13 ASSESSMENT — EXTERNAL EXAM - RIGHT EYE: OD_EXAM: NORMAL

## 2019-11-13 ASSESSMENT — EXTERNAL EXAM - LEFT EYE: OS_EXAM: NORMAL

## 2019-11-13 NOTE — PROGRESS NOTES
"History  HPI     Dry Eye Left Eye     In left eye.  Associated symptoms include dryness, foreign body sensation (\"feels like sand in my eye\" per pt), itching and irritation.  Negative for eye pain, blurred vision, discharge, burning, mattering and red eyes.  Since onset it is stable.  Response to treatment was no improvement.              Comments     Patient notes she went to her PCP due to dryness, gabriel feeling all the time, PCP couldn't see anything wrong with eyes so she is here today to find out what is going on with her LE, notes that this has been going on for a few months, would like DMII but I told her she is not due until next yr march for exam.     Brandy RICO November 13, 2019 4:00 PM            Last edited by Marcella Leon on 11/13/2019  4:09 PM. (History)          Assessment/Plan  (H02.88A,  H02.88B) Meibomian gland dysfunction (MGD) of upper and lower lids of both eyes  (primary encounter diagnosis)  Comment: Symptomatic with foreign body sensation  Plan:  Educated patient on condition and clinical findings. Recommended warm compresses twice each day for ten minutes and Systane Balance 2-4 times daily. Monitor annually, or sooner if symptoms worsen.    Return to clinic in 6 months for comprehensive eye exam.    Complete documentation of historical and exam elements from today's encounter can  be found in the full encounter summary report (not reduplicated in this progress  note). I personally obtained the chief complaint(s) and history of present illness. I  confirmed and edited as necessary the review of systems, past medical/surgical  history, family history, social history, and examination findings as documented by  others; and I examined the patient myself. I personally reviewed the relevant tests,  images, and reports as documented above. I formulated and edited as necessary the  assessment and plan and discussed the findings and management plan with the  patient and family.    Gutierrez GARZA" JEANNINE Modi, [facility]

## 2019-11-13 NOTE — NURSING NOTE
"Chief Complaints and History of Present Illnesses   Patient presents with     Dry Eye Left Eye     Chief Complaint(s) and History of Present Illness(es)     Dry Eye Left Eye     Laterality: left eye    Associated symptoms: dryness, foreign body sensation (\"feels like sand in my eye\" per pt), itching and irritation.  Negative for eye pain, blurred vision, discharge, burning, mattering and red eyes    Course: stable    Response to treatment: no improvement              Comments     Patient notes she went to her PCP due to dryness, gabriel feeling all the time, PCP couldn't see anything wrong with eyes so she is here today to find out what is going on with her LE, notes that this has been going on for a few months, would like DMII but I told her she is not due until next yr march for exam.     Brandy Leon COT November 13, 2019 4:00 PM                  "

## 2020-11-17 ENCOUNTER — MEDICAL CORRESPONDENCE (OUTPATIENT)
Dept: HEALTH INFORMATION MANAGEMENT | Facility: CLINIC | Age: 60
End: 2020-11-17

## 2020-11-18 ENCOUNTER — TRANSCRIBE ORDERS (OUTPATIENT)
Dept: OTHER | Age: 60
End: 2020-11-18

## 2020-11-18 DIAGNOSIS — I10 ESSENTIAL HYPERTENSION: Primary | ICD-10-CM

## 2020-11-18 DIAGNOSIS — E11.9 DIABETIC EYE EXAM (H): ICD-10-CM

## 2020-11-18 DIAGNOSIS — Z01.00 DIABETIC EYE EXAM (H): ICD-10-CM

## 2021-02-22 NOTE — PROGRESS NOTES
Chief Complaint   Patient presents with     Follow Up     Diabetic foot care. Orthotics. Numbness in lower extremities.             Allergies   Allergen Reactions     Advil [Ibuprofen] Nausea and Vomiting     Hard on her stomach/digestion          Subjective: Lucia is a 58 year old female who presents to the clinic today for a diabetic foot exam and management. She relates that she still does have neuropathic symptoms. She relates that these are intermittent and not frequent enough to try any medicaitoins Last seen 6/28/19.  She is ready for new shoes.     Objective  Hemoglobin A1C   Date Value Ref Range Status   09/26/2005 5.6 4.3 - 6.0 % Final     Comment:     Assayed at [place] [address]   04/20/2005 6.3 (H) 4.3 - 6.0 % Final     Comment:     Assayed at [place] [address]     Contains abnormal data HEMOGLOBIN A1C  Order: [id number]  (suggestion)  Information displayed in this report will not trend or trigger automated decision support.    Ref Range & Units 3mo ago   Hemoglobin A1c, POC 0 - 6.4 % 8.6High     Comment: Normal <5.7% Prediabetes 5.7-6.4%  Diabetes 6.5% or higher - adopted from ADA   consensus guidelines.   Assayed at [place] [address]   Resulting Agency  [facility]   Specimen Collected: 11/17/20  4:57 PM Last Resulted: 11/17/20  5:16 PM   Received From: ERMELINDA  Result Received: 02/24/21  9:44 AM         PT and DP pulses are 1/4 bilaterally. CRT is >3 seconds. Diminished pedal hair.   Gross sensation is intact bilaterally. Protective sensation is intact as demonstrated with a 5.07G monofilament. Paresthesia not reproducible today. No Tinel's sign.   Equinus is noted bilaterally. No pain with active or passive ROM of the ankle, MTJ, 1st ray, or halluces bilaterally. Pes planus BL. Hammertoes BL  Nails thickened, yellow, brittle, with subungual  debris bilaterally at the halluces, 2nd, and 5th digits. Only right hallux and 2nd digit are elongated. Remaining nails are dystrophic. No open lesions are noted. Porokeratosis to the right plantar 5th met head has resolved.      Assessment: DM2  PVD  Onychomycosis x 6. Dystrophic nails x 4  Paresthesia - likely diabetic neuropathy  Pes planus with hammertoes.         Plan:  - Pt seen and evaluated.   - Rx for diabetic shoes.   - Nails were debrided x 2.   - See again in 4 months.

## 2021-02-24 ENCOUNTER — OFFICE VISIT (OUTPATIENT)
Dept: ORTHOPEDICS | Facility: CLINIC | Age: 61
End: 2021-02-24
Payer: COMMERCIAL

## 2021-02-24 DIAGNOSIS — M20.41 HAMMER TOES OF BOTH FEET: ICD-10-CM

## 2021-02-24 DIAGNOSIS — E11.65 TYPE II OR UNSPECIFIED TYPE DIABETES MELLITUS WITH NEUROLOGICAL MANIFESTATIONS, UNCONTROLLED(250.62) (H): ICD-10-CM

## 2021-02-24 DIAGNOSIS — E11.49 TYPE II OR UNSPECIFIED TYPE DIABETES MELLITUS WITH NEUROLOGICAL MANIFESTATIONS, UNCONTROLLED(250.62) (H): ICD-10-CM

## 2021-02-24 DIAGNOSIS — B35.1 DERMATOPHYTOSIS OF NAIL: ICD-10-CM

## 2021-02-24 DIAGNOSIS — Q66.6 PES PLANOVALGUS: ICD-10-CM

## 2021-02-24 DIAGNOSIS — M25.532 LEFT WRIST PAIN: Primary | ICD-10-CM

## 2021-02-24 DIAGNOSIS — M20.42 HAMMER TOES OF BOTH FEET: ICD-10-CM

## 2021-02-24 PROCEDURE — 99213 OFFICE O/P EST LOW 20 MIN: CPT | Performed by: PODIATRIST

## 2021-02-24 PROCEDURE — 11720 DEBRIDE NAIL 1-5: CPT | Mod: Q8 | Performed by: PODIATRIST

## 2021-02-24 NOTE — TELEPHONE ENCOUNTER
DIAGNOSIS: Left wrist pain   Dr. Sanchez referring   APPOINTMENT DATE: 3.19.21   NOTES STATUS DETAILS   OFFICE NOTE from referring provider Internal 2.24.21 Dr Dimitri Ying, [facility]   OFFICE NOTE from other specialist N/A    DISCHARGE SUMMARY from hospital N/A    DISCHARGE REPORT from the ER N/A    OPERATIVE REPORT N/A    EMG report N/A    MEDICATION LIST Internal    MRI N/A    DEXA (osteoporosis/bone health) N/A    CT SCAN N/A    XRAYS (IMAGES & REPORTS) N/A

## 2021-02-24 NOTE — NURSING NOTE
Reason For Visit:   Chief Complaint   Patient presents with     Follow Up     Diabetic foot care. Orthotics. Numbness in lower extremities.        Pain Assessment  Patient Currently in Pain: Denies  Primary Pain Location: Foot        Allergies   Allergen Reactions     Advil [Ibuprofen] Nausea and Vomiting     Hard on her stomach/digestion            Dottie Cage LPN

## 2021-02-24 NOTE — LETTER
2/24/2021         RE: Lucia Oliver  [address]  [facility] [address]        Dear Colleague,    Thank you for referring your patient, Lucia Oliver, to the [facility] [place]. Please see a copy of my visit note below.    Chief Complaint   Patient presents with     Follow Up     Diabetic foot care. Orthotics. Numbness in lower extremities.             Allergies   Allergen Reactions     Advil [Ibuprofen] Nausea and Vomiting     Hard on her stomach/digestion          Subjective: Lucia is a 58 year old female who presents to the clinic today for a diabetic foot exam and management. She relates that she still does have neuropathic symptoms. She relates that these are intermittent and not frequent enough to try any medicaitoins Last seen 6/28/19.  She is ready for new shoes.     Objective  Hemoglobin A1C   Date Value Ref Range Status   09/26/2005 5.6 4.3 - 6.0 % Final     Comment:     Assayed at [place] [address]   04/20/2005 6.3 (H) 4.3 - 6.0 % Final     Comment:     Assayed at [place] [address]     Contains abnormal data HEMOGLOBIN A1C  Order: [id number]  (suggestion)  Information displayed in this report will not trend or trigger automated decision support.    Ref Range & Units 3mo ago   Hemoglobin A1c, POC 0 - 6.4 % 8.6High     Comment: Normal <5.7% Prediabetes 5.7-6.4%  Diabetes 6.5% or higher - adopted from ADA   consensus guidelines.   Assayed at [place] [address]   Resulting Agency  [facility]   Specimen Collected: 11/17/20  4:57 PM Last Resulted: 11/17/20  5:16 PM   Received From: ERMELINDA  Result Received: 02/24/21  9:44 AM         PT and DP pulses are 1/4 bilaterally. CRT is >3 seconds. Diminished pedal hair.   Gross sensation is intact bilaterally. Protective sensation is intact as demonstrated with a 5.07G monofilament.  Paresthesia not reproducible today. No Tinel's sign.   Equinus is noted bilaterally. No pain with active or passive ROM of the ankle, MTJ, 1st ray, or halluces bilaterally. Pes planus BL. Hammertoes BL  Nails thickened, yellow, brittle, with subungual debris bilaterally at the halluces, 2nd, and 5th digits. Only right hallux and 2nd digit are elongated. Remaining nails are dystrophic. No open lesions are noted. Porokeratosis to the right plantar 5th met head has resolved.      Assessment: DM2  PVD  Onychomycosis x 6. Dystrophic nails x 4  Paresthesia - likely diabetic neuropathy  Pes planus with hammertoes.         Plan:  - Pt seen and evaluated.   - Rx for diabetic shoes.   - Nails were debrided x 2.   - See again in 4 months.            Dimitri Sanchez DPM

## 2021-03-02 ENCOUNTER — DOCUMENTATION ONLY (OUTPATIENT)
Dept: CARE COORDINATION | Facility: CLINIC | Age: 61
End: 2021-03-02

## 2021-03-19 ENCOUNTER — PRE VISIT (OUTPATIENT)
Dept: ORTHOPEDICS | Facility: CLINIC | Age: 61
End: 2021-03-19

## 2021-03-19 DIAGNOSIS — M25.532 LEFT WRIST PAIN: Primary | ICD-10-CM

## 2021-03-24 ENCOUNTER — OFFICE VISIT (OUTPATIENT)
Dept: OPHTHALMOLOGY | Facility: CLINIC | Age: 61
End: 2021-03-24
Payer: COMMERCIAL

## 2021-03-24 DIAGNOSIS — H11.132 CONJUNCTIVAL PIGMENTATIONS OF LEFT EYE: ICD-10-CM

## 2021-03-24 DIAGNOSIS — E11.9 DIABETES MELLITUS TYPE 2 WITHOUT RETINOPATHY (H): Primary | ICD-10-CM

## 2021-03-24 DIAGNOSIS — H52.13 MYOPIA OF BOTH EYES: ICD-10-CM

## 2021-03-24 DIAGNOSIS — H02.88A MEIBOMIAN GLAND DYSFUNCTION (MGD) OF UPPER AND LOWER LIDS OF BOTH EYES: ICD-10-CM

## 2021-03-24 DIAGNOSIS — H02.88B MEIBOMIAN GLAND DYSFUNCTION (MGD) OF UPPER AND LOWER LIDS OF BOTH EYES: ICD-10-CM

## 2021-03-24 DIAGNOSIS — H40.003 GLAUCOMA SUSPECT OF BOTH EYES: ICD-10-CM

## 2021-03-24 DIAGNOSIS — H25.13 NUCLEAR SCLEROSIS OF BOTH EYES: ICD-10-CM

## 2021-03-24 PROCEDURE — 92015 DETERMINE REFRACTIVE STATE: CPT | Performed by: OPTOMETRIST

## 2021-03-24 PROCEDURE — 92133 CPTRZD OPH DX IMG PST SGM ON: CPT | Performed by: OPTOMETRIST

## 2021-03-24 PROCEDURE — 92014 COMPRE OPH EXAM EST PT 1/>: CPT | Performed by: OPTOMETRIST

## 2021-03-24 ASSESSMENT — CUP TO DISC RATIO
OS_RATIO: 0.7
OD_RATIO: 0.55

## 2021-03-24 ASSESSMENT — REFRACTION_WEARINGRX
OD_SPHERE: -0.25
OS_AXIS: 168
SPECS_TYPE: LAST MRX IN PHOROPTER
OD_AXIS: 175
OS_ADD: +2.50
OS_CYLINDER: +1.25
OD_CYLINDER: +0.50
OS_SPHERE: -0.75
OD_ADD: +2.50

## 2021-03-24 ASSESSMENT — REFRACTION_MANIFEST
OS_ADD: +2.50
OD_ADD: +2.50
OS_SPHERE: -1.75
OD_CYLINDER: +0.50
OD_AXIS: 175
OS_AXIS: 170
OD_SPHERE: -0.25
OS_CYLINDER: +1.25

## 2021-03-24 ASSESSMENT — EXTERNAL EXAM - RIGHT EYE: OD_EXAM: NORMAL

## 2021-03-24 ASSESSMENT — VISUAL ACUITY
CORRECTION_TYPE: GLASSES
METHOD: SNELLEN - LINEAR
OD_CC+: -2
OS_CC: 20/40
OD_CC: 20/20

## 2021-03-24 ASSESSMENT — SLIT LAMP EXAM - LIDS
COMMENTS: 1+ MGD
COMMENTS: 1+ MGD

## 2021-03-24 ASSESSMENT — TONOMETRY
IOP_METHOD: ICARE
OS_IOP_MMHG: 15
OD_IOP_MMHG: 16

## 2021-03-24 ASSESSMENT — CONF VISUAL FIELD
METHOD: COUNTING FINGERS
OS_NORMAL: 1
OD_NORMAL: 1

## 2021-03-24 ASSESSMENT — EXTERNAL EXAM - LEFT EYE: OS_EXAM: NORMAL

## 2021-03-24 NOTE — NURSING NOTE
Chief Complaints and History of Present Illnesses   Patient presents with     Eye Exam For Diabetes     Chief Complaint(s) and History of Present Illness(es)     Eye Exam For Diabetes     Laterality: both eyes    Associated symptoms: dryness (pt is using AT q weekly).  Negative for floaters, flashes, eye pain and tearing    Treatments tried: artificial tears    Pain scale: 0/10              Comments     Pt notes that she has no vision issues today that are concerning to her.   Last BGL: unsure.   Last A1C: 8.0  Lab Results       Component                Value               Date                       A1C                      5.6                 09/26/2005                 A1C                      6.3                 04/20/2005              Brandy RICO March 24, 2021 12:35 PM

## 2021-03-24 NOTE — PROGRESS NOTES
History  HPI     Eye Exam For Diabetes     In both eyes.  Associated symptoms include dryness (pt is using AT q weekly).  Negative for floaters, flashes, eye pain and tearing.  Treatments tried include artificial tears.  Pain was noted as 0/10.              Comments     Pt notes that she has no vision issues today that are concerning to her.   Last BGL: unsure.   Last A1C: 8.0  Lab Results       Component                Value               Date                       A1C                      5.6                 09/26/2005                 A1C                      6.3                 04/20/2005              Brandy Leon COT March 24, 2021 12:35 PM            Last edited by Denisse Leon on 3/24/2021 12:39 PM. (History)          Assessment/Plan  (E11.9) Diabetes mellitus type 2 without retinopathy (H)  (primary encounter diagnosis)  Comment: No retinopathy  Plan:  Educated patient on clinical findings and the importance of continued management with primary care physician. Continue management as directed and return to clinic in 1 year for dilated exam, or sooner, as needed.    (H25.13) Nuclear sclerosis of both eyes  Comment: Not visually significant  Plan:  No treatment indicated at this time. Monitor annually.    (H52.13) Myopia of both eyes  Comment: Mixed astigmatism right eye, myopic astigmatism left eye, presbyopia  Plan: REFRACTION         Dispensed spectacle prescription for full time wear. Monitor annually.    (H40.003) Glaucoma suspect of both eyes  Comment: Secondary to significant cupping both eyes, no RNFL thinning on OCT, findings consistent with physiological cupping  Plan: OCT Optic Nerve RNFL Spectralis OU (both eyes)         No treatment indicated at this time. Monitor annually.    (H02.88A,  H02.88B) Meibomian gland dysfunction (MGD) of upper and lower lids of both eyes  Comment: Symptoms resolved with artificial tears both eyes   Plan:  Continue use of artificial tears. Monitor annually.    (H11.132)  Conjunctival pigmentations of left eye  Comment: Small melanocytic lesion, stable to previous photos  Plan:  No treatment indicated at this time. Monitor annually.    Return to clinic in 1 year for comprehensive eye exam.    Complete documentation of historical and exam elements from today's encounter can  be found in the full encounter summary report (not reduplicated in this progress  note). I personally obtained the chief complaint(s) and history of present illness. I  confirmed and edited as necessary the review of systems, past medical/surgical  history, family history, social history, and examination findings as documented by  others; and I examined the patient myself. I personally reviewed the relevant tests,  images, and reports as documented above. I formulated and edited as necessary the  assessment and plan and discussed the findings and management plan with the  patient and family.    Gutierrez Modi OD, FAAO

## 2021-04-08 ENCOUNTER — APPOINTMENT (OUTPATIENT)
Dept: INTERPRETER SERVICES | Facility: CLINIC | Age: 61
End: 2021-04-08
Payer: COMMERCIAL

## 2021-07-21 ENCOUNTER — LAB REQUISITION (OUTPATIENT)
Dept: LAB | Facility: CLINIC | Age: 61
End: 2021-07-21
Payer: COMMERCIAL

## 2021-07-21 DIAGNOSIS — E11.9 TYPE 2 DIABETES MELLITUS WITHOUT COMPLICATIONS (H): ICD-10-CM

## 2021-07-21 DIAGNOSIS — R63.4 ABNORMAL WEIGHT LOSS: ICD-10-CM

## 2021-07-21 PROCEDURE — G0145 SCR C/V CYTO,THINLAYER,RESCR: HCPCS | Mod: ORL | Performed by: NURSE PRACTITIONER

## 2021-07-21 PROCEDURE — 87086 URINE CULTURE/COLONY COUNT: CPT | Mod: ORL | Performed by: NURSE PRACTITIONER

## 2021-07-21 PROCEDURE — 86803 HEPATITIS C AB TEST: CPT | Mod: ORL | Performed by: NURSE PRACTITIONER

## 2021-07-21 PROCEDURE — 87624 HPV HI-RISK TYP POOLED RSLT: CPT | Mod: ORL | Performed by: NURSE PRACTITIONER

## 2021-07-21 PROCEDURE — 86780 TREPONEMA PALLIDUM: CPT | Mod: ORL | Performed by: NURSE PRACTITIONER

## 2021-07-22 ENCOUNTER — TRANSCRIBE ORDERS (OUTPATIENT)
Dept: OTHER | Age: 61
End: 2021-07-22

## 2021-07-22 DIAGNOSIS — R63.4 WEIGHT LOSS, NON-INTENTIONAL: Primary | ICD-10-CM

## 2021-07-22 DIAGNOSIS — Z12.11 COLON CANCER SCREENING: ICD-10-CM

## 2021-07-22 LAB
HCV AB SERPL QL IA: NONREACTIVE
T PALLIDUM AB SER QL: NONREACTIVE

## 2021-07-23 LAB — BACTERIA UR CULT: ABNORMAL

## 2021-07-28 LAB
BKR LAB AP GYN ADEQUACY: NORMAL
BKR LAB AP GYN INTERPRETATION: NORMAL
BKR LAB AP HPV REFLEX: NORMAL
BKR LAB AP PREVIOUS ABNORMAL: NORMAL
PATH REPORT.COMMENTS IMP SPEC: NORMAL
PATH REPORT.RELEVANT HX SPEC: NORMAL

## 2021-07-29 LAB
HUMAN PAPILLOMA VIRUS 16 DNA: NEGATIVE
HUMAN PAPILLOMA VIRUS 18 DNA: NEGATIVE
HUMAN PAPILLOMA VIRUS FINAL DIAGNOSIS: NORMAL
HUMAN PAPILLOMA VIRUS OTHER HR: NEGATIVE

## 2021-11-08 ENCOUNTER — LAB REQUISITION (OUTPATIENT)
Dept: LAB | Facility: CLINIC | Age: 61
End: 2021-11-08
Payer: COMMERCIAL

## 2021-11-08 DIAGNOSIS — E78.5 HYPERLIPIDEMIA, UNSPECIFIED: ICD-10-CM

## 2021-11-08 DIAGNOSIS — R63.4 ABNORMAL WEIGHT LOSS: ICD-10-CM

## 2021-11-08 DIAGNOSIS — E11.9 TYPE 2 DIABETES MELLITUS WITHOUT COMPLICATIONS (H): ICD-10-CM

## 2021-11-08 DIAGNOSIS — I10 ESSENTIAL (PRIMARY) HYPERTENSION: ICD-10-CM

## 2021-11-08 LAB
ALBUMIN SERPL-MCNC: 4.1 G/DL (ref 3.4–5)
ALP SERPL-CCNC: 62 U/L (ref 40–150)
ALT SERPL W P-5'-P-CCNC: 21 U/L (ref 0–50)
ANION GAP SERPL CALCULATED.3IONS-SCNC: 7 MMOL/L (ref 3–14)
AST SERPL W P-5'-P-CCNC: 12 U/L (ref 0–45)
BILIRUB SERPL-MCNC: 0.3 MG/DL (ref 0.2–1.3)
BUN SERPL-MCNC: 7 MG/DL (ref 7–30)
CALCIUM SERPL-MCNC: 9.2 MG/DL (ref 8.5–10.1)
CHLORIDE BLD-SCNC: 101 MMOL/L (ref 94–109)
CO2 SERPL-SCNC: 29 MMOL/L (ref 20–32)
CREAT SERPL-MCNC: 0.45 MG/DL (ref 0.52–1.04)
CREAT UR-MCNC: 7 MG/DL
GFR SERPL CREATININE-BSD FRML MDRD: >90 ML/MIN/1.73M2
GLUCOSE BLD-MCNC: 177 MG/DL (ref 70–99)
MICROALBUMIN UR-MCNC: <5 MG/L
MICROALBUMIN/CREAT UR: NORMAL MG/G{CREAT}
POTASSIUM BLD-SCNC: 3.5 MMOL/L (ref 3.4–5.3)
PROT SERPL-MCNC: 7.6 G/DL (ref 6.8–8.8)
SODIUM SERPL-SCNC: 137 MMOL/L (ref 133–144)
TSH SERPL DL<=0.005 MIU/L-ACNC: 0.76 MU/L (ref 0.4–4)

## 2021-11-08 PROCEDURE — 82043 UR ALBUMIN QUANTITATIVE: CPT | Mod: ORL | Performed by: FAMILY MEDICINE

## 2021-11-08 PROCEDURE — 80053 COMPREHEN METABOLIC PANEL: CPT | Mod: ORL | Performed by: FAMILY MEDICINE

## 2021-11-08 PROCEDURE — 84443 ASSAY THYROID STIM HORMONE: CPT | Mod: ORL | Performed by: FAMILY MEDICINE

## 2021-11-12 ENCOUNTER — LAB REQUISITION (OUTPATIENT)
Dept: LAB | Facility: CLINIC | Age: 61
End: 2021-11-12
Payer: COMMERCIAL

## 2021-11-12 DIAGNOSIS — R63.4 ABNORMAL WEIGHT LOSS: ICD-10-CM

## 2021-11-12 DIAGNOSIS — Z12.11 ENCOUNTER FOR SCREENING FOR MALIGNANT NEOPLASM OF COLON: ICD-10-CM

## 2021-11-12 LAB — HEMOCCULT STL QL IA: NEGATIVE

## 2021-11-12 PROCEDURE — 82274 ASSAY TEST FOR BLOOD FECAL: CPT | Mod: ORL | Performed by: FAMILY MEDICINE

## 2022-03-28 ENCOUNTER — APPOINTMENT (OUTPATIENT)
Dept: INTERPRETER SERVICES | Facility: CLINIC | Age: 62
End: 2022-03-28
Payer: COMMERCIAL

## 2022-04-12 ENCOUNTER — ANCILLARY PROCEDURE (OUTPATIENT)
Dept: MAMMOGRAPHY | Facility: CLINIC | Age: 62
End: 2022-04-12
Attending: NURSE PRACTITIONER
Payer: COMMERCIAL

## 2022-04-12 DIAGNOSIS — Z12.31 VISIT FOR SCREENING MAMMOGRAM: ICD-10-CM

## 2022-04-12 PROCEDURE — 77067 SCR MAMMO BI INCL CAD: CPT | Performed by: STUDENT IN AN ORGANIZED HEALTH CARE EDUCATION/TRAINING PROGRAM

## 2022-05-06 ENCOUNTER — APPOINTMENT (OUTPATIENT)
Dept: INTERPRETER SERVICES | Facility: CLINIC | Age: 62
End: 2022-05-06
Payer: COMMERCIAL

## 2022-06-12 ENCOUNTER — NURSE TRIAGE (OUTPATIENT)
Dept: NURSING | Facility: CLINIC | Age: 62
End: 2022-06-12
Payer: COMMERCIAL

## 2022-06-12 NOTE — TELEPHONE ENCOUNTER
Covid positive, symptoms for 9-10 days. Coughing still but better and no fever. Per CDC guidelines she will be out of quarantine on day 11 after symptoms started. Until then wear a mask around others. Continue to treat symptoms as needed.   Caller voiced understanding and will follow disposition.   Collette Newton RN  [facility] Nurse Advisor            Reason for Disposition    [1] COVID-19 diagnosed by positive lab test (e.g., PCR, rapid self-test kit) AND [2] mild symptoms (e.g., cough, fever, others) AND [3] no complications or SOB    Additional Information    Negative: SEVERE difficulty breathing (e.g., struggling for each breath, speaks in single words)    Negative: Difficult to awaken or acting confused (e.g., disoriented, slurred speech)    Negative: Bluish (or gray) lips or face now    Negative: Shock suspected (e.g., cold/pale/clammy skin, too weak to stand, low BP, rapid pulse)    Negative: Sounds like a life-threatening emergency to the triager    Negative: [1] Diagnosed or suspected COVID-19 AND [2] symptoms lasting 3 or more weeks    Negative: [1] COVID-19 exposure AND [2] no symptoms    Negative: COVID-19 vaccine reaction suspected (e.g., fever, headache, muscle aches) occurring 1 to 3 days after getting vaccine    Negative: COVID-19 vaccine, questions about    Negative: [1] Lives with someone known to have influenza (flu test positive) AND [2] flu-like symptoms (e.g., cough, runny nose, sore throat, SOB; with or without fever)    Negative: [1] Adult with possible COVID-19 symptoms AND [2] triager concerned about severity of symptoms or other causes    Negative: COVID-19 and breastfeeding, questions about    Negative: SEVERE or constant chest pain or pressure  (Exception: Mild central chest pain, present only when coughing.)    Negative: MODERATE difficulty breathing (e.g., speaks in phrases, SOB even at rest, pulse 100-120)    Negative: [1] Headache AND [2] stiff neck (can't touch chin to chest)    Negative:  Oxygen level (e.g., pulse oximetry) 90 percent or lower    Negative: Chest pain or pressure    Negative: Patient sounds very sick or weak to the triager    Negative: MILD difficulty breathing (e.g., minimal/no SOB at rest, SOB with walking, pulse <100)    Negative: Fever > 103 F (39.4 C)    Negative: [1] Fever > 101 F (38.3 C) AND [2] age > 60 years    Negative: [1] Fever > 100.0 F (37.8 C) AND [2] bedridden (e.g., nursing home patient, CVA, chronic illness, recovering from surgery)    Negative: HIGH RISK for severe COVID complications (e.g., weak immune system, age > 64 years, obesity with BMI > 25, pregnant, chronic lung disease or other chronic medical condition)  (Exception: Already seen by PCP and no new or worsening symptoms.)    Negative: [1] HIGH RISK patient AND [2] influenza is widespread in the community AND [3] ONE OR MORE respiratory symptoms: cough, sore throat, runny or stuffy nose    Negative: [1] HIGH RISK patient AND [2] influenza exposure within the last 7 days AND [3] ONE OR MORE respiratory symptoms: cough, sore throat, runny or stuffy nose    Negative: Oxygen level (e.g., pulse oximetry) 91 to 94 percent    Negative: Fever present > 3 days (72 hours)    Negative: [1] Fever returns after gone for over 24 hours AND [2] symptoms worse or not improved    Negative: [1] Continuous (nonstop) coughing interferes with work or school AND [2] no improvement using cough treatment per Care Advice    Negative: [1] COVID-19 infection suspected by caller or triager AND [2] mild symptoms (cough, fever, or others) AND [3] negative COVID-19 rapid test    Negative: Cough present > 3 weeks    Negative: [1] COVID-19 diagnosed by positive lab test (e.g., PCR, rapid self-test kit) AND [2] NO symptoms (e.g., cough, fever, others)    Protocols used: CORONAVIRUS (COVID-19) DIAGNOSED OR [id number] 1.18.2022

## 2022-07-08 ENCOUNTER — OFFICE VISIT (OUTPATIENT)
Dept: ORTHOPEDICS | Facility: CLINIC | Age: 62
End: 2022-07-08
Payer: COMMERCIAL

## 2022-07-08 DIAGNOSIS — M20.41 HAMMER TOES OF BOTH FEET: ICD-10-CM

## 2022-07-08 DIAGNOSIS — Q66.6 PES PLANOVALGUS: Primary | ICD-10-CM

## 2022-07-08 DIAGNOSIS — M20.42 HAMMER TOES OF BOTH FEET: ICD-10-CM

## 2022-07-08 DIAGNOSIS — E11.49 TYPE II OR UNSPECIFIED TYPE DIABETES MELLITUS WITH NEUROLOGICAL MANIFESTATIONS, UNCONTROLLED(250.62) (H): ICD-10-CM

## 2022-07-08 DIAGNOSIS — M54.2 PAIN IN NECK: ICD-10-CM

## 2022-07-08 DIAGNOSIS — B35.1 DERMATOPHYTOSIS OF NAIL: ICD-10-CM

## 2022-07-08 DIAGNOSIS — E11.65 TYPE II OR UNSPECIFIED TYPE DIABETES MELLITUS WITH NEUROLOGICAL MANIFESTATIONS, UNCONTROLLED(250.62) (H): ICD-10-CM

## 2022-07-08 PROCEDURE — 99213 OFFICE O/P EST LOW 20 MIN: CPT | Mod: 25 | Performed by: PODIATRIST

## 2022-07-08 PROCEDURE — 11721 DEBRIDE NAIL 6 OR MORE: CPT | Mod: Q8 | Performed by: PODIATRIST

## 2022-07-08 RX ORDER — CAPSAICIN 0.025 %
1 CREAM (GRAM) TOPICAL 3 TIMES DAILY
Qty: 120 G | Refills: 11 | Status: SHIPPED | OUTPATIENT
Start: 2022-07-08 | End: 2023-06-16

## 2022-07-08 NOTE — PROGRESS NOTES
Chief Complaint   Patient presents with     Follow Up     1 year follow up. Diabetic foot.               Allergies   Allergen Reactions     Ibuprofen Nausea and Vomiting     Hard on her stomach/digestion   LegacyRecord#[id number]         Subjective: Lucia is a 61 year old female who presents to the clinic today for a diabetic foot exam and management. She relates that she still does have neuropathic symptoms.  I did ask her today if she would like to try a treatment for the neuropathy.  She would try something topical.  She does not want oral medication.  She is ready for new shoes.  She also relates to having pain in her neck on the right side.    Objective  Hemoglobin A1C POCT   Date Value Ref Range Status   09/26/2005 5.6 4.3 - 6.0 % Final     Comment:     Assayed at [place] [address]   04/20/2005 6.3 (H) 4.3 - 6.0 % Final     Comment:     Assayed at [place] [address]     Contains abnormal data HEMOGLOBIN A1C  Order: [id number]  (suggestion)  Information displayed in this report will not trend or trigger automated decision support.    Ref Range & Units 3mo ago   Hemoglobin A1c, POC 0 - 6.4 % 8.6High     Comment: Normal <5.7% Prediabetes 5.7-6.4%  Diabetes 6.5% or higher - adopted from ADA   consensus guidelines.   Assayed at [place] [address]   Resulting Agency  [facility]   Specimen Collected: 11/17/20  4:57 PM Last Resulted: 11/17/20  5:16 PM   Received From: ERMELINDA  Result Received: 02/24/21  9:44 AM         PT and DP pulses are 1/4 bilaterally. CRT is >3 seconds. Diminished pedal hair.   Gross sensation is intact bilaterally. Protective sensation is intact as demonstrated with a 5.07G monofilament. Paresthesia not reproducible today. No Tinel's sign.   Equinus is noted bilaterally. No pain with active or passive ROM of the ankle, MTJ, 1st ray, or halluces  bilaterally. Pes planus BL. Hammertoes BL  Nails thickened, yellow, brittle, with subungual debris bilaterally at the halluces, 2nd, and 5th digits. Only right hallux and 2nd digit are elongated. Remaining nails are dystrophic. No open lesions are noted. Porokeratosis to the right plantar 5th met head has resolved.      Assessment: DM2  PVD  Onychomycosis x 6. Dystrophic nails x 4  Paresthesia - likely diabetic neuropathy  Pes planus with hammertoes.         Plan:  - Pt seen and evaluated.   - Rx for diabetic shoes.   -I sent in a prescription for capsaicin cream.  -Referral to my colleagues in sports medicine for the neck pain.  - Nails were debrided x 8.   - See again in 4 months.

## 2022-07-08 NOTE — NURSING NOTE
Reason For Visit:   Chief Complaint   Patient presents with     Follow Up     1 year follow up. Diabetic foot.                   Allergies   Allergen Reactions     Ibuprofen Nausea and Vomiting     Hard on her stomach/digestion   LegacyRecord#[id number]           Dottie Cage LPN

## 2022-07-11 NOTE — TELEPHONE ENCOUNTER
DIAGNOSIS: Consult   APPOINTMENT DATE: 7.29.22   NOTES STATUS DETAILS   OFFICE NOTE from referring provider Internal 7.8.22 [facility]  4.24.18   MEDICATION LIST Internal    DEXA (osteoporosis/bone health) Internal

## 2022-07-29 ENCOUNTER — PRE VISIT (OUTPATIENT)
Dept: ORTHOPEDICS | Facility: CLINIC | Age: 62
End: 2022-07-29

## 2022-10-10 ENCOUNTER — LAB REQUISITION (OUTPATIENT)
Dept: LAB | Facility: CLINIC | Age: 62
End: 2022-10-10

## 2022-10-10 DIAGNOSIS — E11.9 TYPE 2 DIABETES MELLITUS WITHOUT COMPLICATIONS (H): ICD-10-CM

## 2022-10-10 DIAGNOSIS — I10 ESSENTIAL (PRIMARY) HYPERTENSION: ICD-10-CM

## 2022-10-10 PROCEDURE — 80053 COMPREHEN METABOLIC PANEL: CPT | Performed by: FAMILY MEDICINE

## 2022-10-10 PROCEDURE — 80061 LIPID PANEL: CPT | Performed by: FAMILY MEDICINE

## 2022-10-11 LAB
ALBUMIN SERPL BCG-MCNC: 4.5 G/DL (ref 3.5–5.2)
ALP SERPL-CCNC: 68 U/L (ref 35–104)
ALT SERPL W P-5'-P-CCNC: 19 U/L (ref 10–35)
ANION GAP SERPL CALCULATED.3IONS-SCNC: 25 MMOL/L (ref 7–15)
AST SERPL W P-5'-P-CCNC: 23 U/L (ref 10–35)
BILIRUB SERPL-MCNC: 0.2 MG/DL
BUN SERPL-MCNC: 12.1 MG/DL (ref 8–23)
CALCIUM SERPL-MCNC: 9.8 MG/DL (ref 8.8–10.2)
CHLORIDE SERPL-SCNC: 103 MMOL/L (ref 98–107)
CHOLEST SERPL-MCNC: 191 MG/DL
CREAT SERPL-MCNC: 0.54 MG/DL (ref 0.51–0.95)
DEPRECATED HCO3 PLAS-SCNC: 18 MMOL/L (ref 22–29)
GFR SERPL CREATININE-BSD FRML MDRD: >90 ML/MIN/1.73M2
GLUCOSE SERPL-MCNC: 246 MG/DL (ref 70–99)
HDLC SERPL-MCNC: 82 MG/DL
LDLC SERPL CALC-MCNC: 93 MG/DL
NONHDLC SERPL-MCNC: 109 MG/DL
POTASSIUM SERPL-SCNC: 4.2 MMOL/L (ref 3.4–5.3)
PROT SERPL-MCNC: 7.3 G/DL (ref 6.4–8.3)
SODIUM SERPL-SCNC: 146 MMOL/L (ref 136–145)
TRIGL SERPL-MCNC: 78 MG/DL

## 2022-10-13 ENCOUNTER — TRANSCRIBE ORDERS (OUTPATIENT)
Dept: OTHER | Age: 62
End: 2022-10-13

## 2022-10-13 DIAGNOSIS — E11.9 TYPE 2 DIABETES MELLITUS WITHOUT COMPLICATION, WITHOUT LONG-TERM CURRENT USE OF INSULIN (H): Primary | ICD-10-CM

## 2023-01-07 ENCOUNTER — OFFICE VISIT (OUTPATIENT)
Dept: OPHTHALMOLOGY | Facility: CLINIC | Age: 63
End: 2023-01-07
Attending: FAMILY MEDICINE
Payer: COMMERCIAL

## 2023-01-07 DIAGNOSIS — E11.9 TYPE 2 DIABETES MELLITUS WITHOUT COMPLICATION, WITHOUT LONG-TERM CURRENT USE OF INSULIN (H): ICD-10-CM

## 2023-01-07 DIAGNOSIS — H25.813 COMBINED FORM OF AGE-RELATED CATARACT, BOTH EYES: Primary | ICD-10-CM

## 2023-01-07 DIAGNOSIS — H40.003 GLAUCOMA SUSPECT OF BOTH EYES: ICD-10-CM

## 2023-01-07 PROCEDURE — 92004 COMPRE OPH EXAM NEW PT 1/>: CPT | Performed by: STUDENT IN AN ORGANIZED HEALTH CARE EDUCATION/TRAINING PROGRAM

## 2023-01-07 ASSESSMENT — CUP TO DISC RATIO
OS_RATIO: 0.6
OD_RATIO: 0.5

## 2023-01-07 ASSESSMENT — TONOMETRY
OD_IOP_MMHG: 16
IOP_METHOD: ICARE
OS_IOP_MMHG: 16

## 2023-01-07 ASSESSMENT — VISUAL ACUITY
OD_CC+: -3
METHOD: SNELLEN - LINEAR
CORRECTION_TYPE: GLASSES
OS_CC: 20/40
OD_CC: 20/25

## 2023-01-07 ASSESSMENT — SLIT LAMP EXAM - LIDS
COMMENTS: MILD MGD
COMMENTS: MILD MGD

## 2023-01-07 ASSESSMENT — CONF VISUAL FIELD
OD_NORMAL: 1
OD_SUPERIOR_TEMPORAL_RESTRICTION: 0
OD_SUPERIOR_NASAL_RESTRICTION: 0
OS_NORMAL: 1
OD_INFERIOR_NASAL_RESTRICTION: 0
OS_INFERIOR_NASAL_RESTRICTION: 0
OD_INFERIOR_TEMPORAL_RESTRICTION: 0
OS_SUPERIOR_TEMPORAL_RESTRICTION: 0
METHOD: COUNTING FINGERS
OS_INFERIOR_TEMPORAL_RESTRICTION: 0
OS_SUPERIOR_NASAL_RESTRICTION: 0

## 2023-01-07 ASSESSMENT — REFRACTION_WEARINGRX
OS_SPHERE: -1.75
OS_AXIS: 170
OS_CYLINDER: +1.25
OS_ADD: +2.50
OD_CYLINDER: +0.50
OD_SPHERE: -0.25
OD_AXIS: 175
OD_ADD: +2.50

## 2023-01-07 ASSESSMENT — REFRACTION_MANIFEST
OD_CYLINDER: +0.50
OS_CYLINDER: +1.75
OS_SPHERE: -1.25
OS_ADD: +2.75
OD_ADD: +2.75
OS_AXIS: 170
OD_SPHERE: PLANO
OD_AXIS: 175

## 2023-01-07 ASSESSMENT — EXTERNAL EXAM - LEFT EYE: OS_EXAM: NORMAL

## 2023-01-07 ASSESSMENT — EXTERNAL EXAM - RIGHT EYE: OD_EXAM: NORMAL

## 2023-01-07 NOTE — NURSING NOTE
Chief Complaints and History of Present Illnesses   Patient presents with     COMPREHENSIVE EYE EXAM     Chief Complaint(s) and History of Present Illness(es)     COMPREHENSIVE EYE EXAM           Comments    Patient states that her vision is good. Needs to get her eyes checked due to her diabetes. No pain and irritation. No flashes of light. No floaters.     Ocular Meds:none     Shawn RICO, January 7, 2023 10:51 AM

## 2023-02-06 ENCOUNTER — APPOINTMENT (OUTPATIENT)
Dept: INTERPRETER SERVICES | Facility: CLINIC | Age: 63
End: 2023-02-06
Payer: COMMERCIAL

## 2023-02-06 ENCOUNTER — NURSE TRIAGE (OUTPATIENT)
Dept: CALL CENTER | Age: 63
End: 2023-02-06
Payer: COMMERCIAL

## 2023-02-06 NOTE — TELEPHONE ENCOUNTER
calling/ is w/pt. [facility]    Calling for ENT appt  Wife was seen  In  2-4-23 for right ear bleeding, put on drops-Ofloxicin drops, twice a day, for 7 days,  was told to follow up w/ ENT.  - [facility], [phone].  Denies any pain, bleeding has stopped,    Bleeding, hearing changes/buzzing started  2 days age  Hearing: right ear, decreasing - hears buzzing.  Denies any dizziness, trauma to the ear, fever  Seen in ENT years ago for ear buzzing  Left ear - no problem    : [phone], Rafal Susana  : [phone]    [place] [facility] [place].   [place] [facility] [place]. Pham  High priority note to ENT for scheduling.                Reason for Disposition    Bloody discharge or unexplained bleeding from ear canal    Additional Information    Negative: Sounds like a life-threatening emergency to the triager    Negative: Moving the earlobe or touching the ear clearly increases the pain    Negative: Pink or red swelling behind the ear    Negative: Stiff neck (can't touch chin to chest)    Negative: Patient sounds very sick or weak to the triager    Negative: Severe earache pain    Negative: Fever > 103 F (39.4 C)    Negative: Pointed object was inserted into the ear canal (e.g., a pencil, stick, or wire)    Negative: White, yellow, or green discharge    Negative: Diabetes mellitus or a weak immune system (e.g., HIV positive, cancer chemotherapy, transplant patient)    Protocols used: EARACHE-A-OH

## 2023-02-09 ENCOUNTER — APPOINTMENT (OUTPATIENT)
Dept: INTERPRETER SERVICES | Facility: CLINIC | Age: 63
End: 2023-02-09
Payer: COMMERCIAL

## 2023-02-09 ENCOUNTER — TELEPHONE (OUTPATIENT)
Dept: OTOLARYNGOLOGY | Facility: CLINIC | Age: 63
End: 2023-02-09
Payer: COMMERCIAL

## 2023-02-09 NOTE — TELEPHONE ENCOUNTER
[facility] Call Center    Phone Message    May a detailed message be left on voicemail: yes     Reason for Call: Other: Pts  calling in regards to expecting to here back from us about being able to be seen by ENT @ [facility] . Please reach out to pts  to discuss. thank you      Action Taken: Message routed to:  [facility] ([facility]): ENT     Travel Screening: Not Applicable

## 2023-02-10 NOTE — TELEPHONE ENCOUNTER
FUTURE VISIT INFORMATION      FUTURE VISIT INFORMATION:    Date: 2/16/23    Time: 9am    Location: [facility]  REFERRAL INFORMATION:    Referring provider:      Referring providers clinic:      Reason for visit/diagnosis  Per Mulu armstrong - stephanie diagnosis    RECORDS REQUESTED FROM:       Clinic name Comments Records Status Imaging Status

## 2023-02-16 ENCOUNTER — PRE VISIT (OUTPATIENT)
Dept: OTOLARYNGOLOGY | Facility: CLINIC | Age: 63
End: 2023-02-16

## 2023-02-16 ENCOUNTER — OFFICE VISIT (OUTPATIENT)
Dept: OTOLARYNGOLOGY | Facility: CLINIC | Age: 63
End: 2023-02-16
Payer: COMMERCIAL

## 2023-02-16 VITALS
WEIGHT: 95 LBS | OXYGEN SATURATION: 98 % | TEMPERATURE: 99 F | SYSTOLIC BLOOD PRESSURE: 134 MMHG | HEART RATE: 87 BPM | BODY MASS INDEX: 17.26 KG/M2 | DIASTOLIC BLOOD PRESSURE: 76 MMHG

## 2023-02-16 DIAGNOSIS — H92.21 EAR BLEEDING, RIGHT: Primary | ICD-10-CM

## 2023-02-16 PROCEDURE — 99203 OFFICE O/P NEW LOW 30 MIN: CPT | Mod: 25 | Performed by: REGISTERED NURSE

## 2023-02-16 PROCEDURE — 92504 EAR MICROSCOPY EXAMINATION: CPT | Performed by: REGISTERED NURSE

## 2023-02-16 RX ORDER — PREDNISOLONE ACETATE 10 MG/ML
SUSPENSION/ DROPS OPHTHALMIC
Qty: 5 ML | Refills: 0 | Status: SHIPPED | OUTPATIENT
Start: 2023-02-16

## 2023-02-16 RX ORDER — CIPROFLOXACIN HYDROCHLORIDE 3.5 MG/ML
SOLUTION/ DROPS TOPICAL
Qty: 5 ML | Refills: 0 | Status: SHIPPED | OUTPATIENT
Start: 2023-02-16

## 2023-02-16 RX ORDER — AMOXICILLIN 500 MG/1
CAPSULE ORAL 2 TIMES DAILY
COMMUNITY

## 2023-02-16 ASSESSMENT — PAIN SCALES - GENERAL: PAINLEVEL: NO PAIN (0)

## 2023-02-16 NOTE — PROGRESS NOTES
Otolaryngology Clinic  February 16, 2023    HPI:  Lucia Oliver is here for assessment of right ear bleeding. Patient reports an acute episode of right ear bleeding on 2/4/23. Seen in urgent care and placed on ofloxacin drops x 1 week with instructions to follow up in ENT for further evaluation.    Patient's history is obtained with assistance of a Danish . Patient states that she woke up in the middle of the night and felt moisture in right ear which turned out to be blood. Patient denies pain but had tinnitus and hearing loss in the right ear that has slowly resolved. Now notes new tinnitus in the left ear.     Patient does report using a q-tip to the right ear a few days prior to bleeding due to ear itching. Denies any nasal congestion or ear plugging prior to onset of bleeding.     Patient denies any dizziness, facial numbness/weakness, history of frequent ear infections, or ear surgeries.     Otologic microscope exam:    Patient's ear pathology required use of the binocular microscope for the purpose of cleaning and improving visualization in order to assure a more accurate diagnostic evaluation.    Right ear was examined under the microscope. Bloody crusting removed with right angled hook and alligator forceps. Large blood clot behind crusting filling canal. It was cleaned with suction. Once cleaned, TM visualized under microscope. TM intact but thickened due to moisture.     Left ear was also examined under the microscope. Narrow canal. Ceruminous crusting cleaned with alligator forceps. Once cleaned, TM visualized under microscope. Normal appearing TM, nicely aerated middle ear space.     Assessment and Plan:  1. Ear bleeding, right  The patient presents for assessment of right ear bleeding. Right ear canal was filled with bloody crusting and clot. Thoroughly cleaned under microscopy today. TM intact without evidence of perforation. Suspect trauma from q-tip use causing bleeding and clotting in  the ear canal. Will have patient use ciprodex drops to treat moisture and inflammation. Recommend patient refrain from q-tip use.      Return needed if symptoms worsen or fail to improve. If left tinnitus remains bothersome, patient should return to clinic with audiogram for further evaluation.     Maribell Davidson DNP, APRN, CNP  Otolaryngology  Head & Neck Surgery  [phone]    30 minutes spent on the date of the encounter doing chart review, history and exam, documentation and further activities per the note excluding time performing examination and ear cleaning under microscopy.

## 2023-02-16 NOTE — LETTER
2/16/2023       RE: Lucia Oliver  [address]  [facility] [address]     Dear Colleague,    Thank you for referring your patient, Lucia Oliver, to the [facility] [place] at [facility]. Please see a copy of my visit note below.      Otolaryngology Clinic  February 16, 2023    HPI:  Lucia Oliver is here for assessment of right ear bleeding. Patient reports an acute episode of right ear bleeding on 2/4/23. Seen in urgent care and placed on ofloxacin drops x 1 week with instructions to follow up in ENT for further evaluation.    Patient's history is obtained with assistance of a Kazakh . Patient states that she woke up in the middle of the night and felt moisture in right ear which turned out to be blood. Patient denies pain but had tinnitus and hearing loss in the right ear that has slowly resolved. Now notes new tinnitus in the left ear.     Patient does report using a q-tip to the right ear a few days prior to bleeding due to ear itching. Denies any nasal congestion or ear plugging prior to onset of bleeding.     Patient denies any dizziness, facial numbness/weakness, history of frequent ear infections, or ear surgeries.     Otologic microscope exam:    Patient's ear pathology required use of the binocular microscope for the purpose of cleaning and improving visualization in order to assure a more accurate diagnostic evaluation.    Right ear was examined under the microscope. Bloody crusting removed with right angled hook and alligator forceps. Large blood clot behind crusting filling canal. It was cleaned with suction. Once cleaned, TM visualized under microscope. TM intact but thickened due to moisture.     Left ear was also examined under the microscope. Narrow canal. Ceruminous crusting cleaned with alligator forceps. Once cleaned, TM visualized under microscope. Normal appearing TM, nicely aerated middle ear space.      Assessment and Plan:  1. Ear bleeding, right  The patient presents for assessment of right ear bleeding. Right ear canal was filled with bloody crusting and clot. Thoroughly cleaned under microscopy today. TM intact without evidence of perforation. Suspect trauma from q-tip use causing bleeding and clotting in the ear canal. Will have patient use ciprodex drops to treat moisture and inflammation. Recommend patient refrain from q-tip use.      Return needed if symptoms worsen or fail to improve. If left tinnitus remains bothersome, patient should return to clinic with audiogram for further evaluation.     Maribell Davidson DNP, APRN, CNP  Otolaryngology  Head & Neck Surgery  [phone]    30 minutes spent on the date of the encounter doing chart review, history and exam, documentation and further activities per the note excluding time performing examination and ear cleaning under microscopy.

## 2023-02-16 NOTE — NURSING NOTE
Chief Complaint   Patient presents with     Consult     Bleeding from right ear, bilateral tinnitus and hearing loss      Blood pressure 134/76, pulse 87, temperature 99  F (37.2  C), weight 43.1 kg (95 lb), SpO2 98 %.    Tom Wei LPN

## 2023-03-27 ENCOUNTER — LAB REQUISITION (OUTPATIENT)
Dept: LAB | Facility: CLINIC | Age: 63
End: 2023-03-27
Payer: COMMERCIAL

## 2023-03-27 DIAGNOSIS — E11.9 TYPE 2 DIABETES MELLITUS WITHOUT COMPLICATIONS (H): ICD-10-CM

## 2023-03-27 DIAGNOSIS — Z12.11 ENCOUNTER FOR SCREENING FOR MALIGNANT NEOPLASM OF COLON: ICD-10-CM

## 2023-03-27 DIAGNOSIS — E78.5 HYPERLIPIDEMIA, UNSPECIFIED: ICD-10-CM

## 2023-03-27 DIAGNOSIS — I10 ESSENTIAL (PRIMARY) HYPERTENSION: ICD-10-CM

## 2023-03-27 LAB
CREAT UR-MCNC: 16 MG/DL
MICROALBUMIN UR-MCNC: <12 MG/L
MICROALBUMIN/CREAT UR: NORMAL MG/G{CREAT}

## 2023-03-27 PROCEDURE — 82570 ASSAY OF URINE CREATININE: CPT | Performed by: FAMILY MEDICINE

## 2023-04-05 ENCOUNTER — LAB REQUISITION (OUTPATIENT)
Dept: LAB | Facility: CLINIC | Age: 63
End: 2023-04-05
Payer: COMMERCIAL

## 2023-04-05 DIAGNOSIS — I10 ESSENTIAL (PRIMARY) HYPERTENSION: ICD-10-CM

## 2023-04-05 DIAGNOSIS — E11.9 TYPE 2 DIABETES MELLITUS WITHOUT COMPLICATIONS (H): ICD-10-CM

## 2023-04-05 DIAGNOSIS — Z12.11 ENCOUNTER FOR SCREENING FOR MALIGNANT NEOPLASM OF COLON: ICD-10-CM

## 2023-04-05 DIAGNOSIS — E78.5 HYPERLIPIDEMIA, UNSPECIFIED: ICD-10-CM

## 2023-04-05 LAB — HEMOCCULT STL QL IA: NEGATIVE

## 2023-04-05 PROCEDURE — 82274 ASSAY TEST FOR BLOOD FECAL: CPT | Mod: ORL | Performed by: FAMILY MEDICINE

## 2023-04-14 ENCOUNTER — APPOINTMENT (OUTPATIENT)
Dept: INTERPRETER SERVICES | Facility: CLINIC | Age: 63
End: 2023-04-14
Payer: COMMERCIAL

## 2023-04-21 ENCOUNTER — ANCILLARY PROCEDURE (OUTPATIENT)
Dept: MAMMOGRAPHY | Facility: CLINIC | Age: 63
End: 2023-04-21
Attending: NURSE PRACTITIONER
Payer: COMMERCIAL

## 2023-04-21 DIAGNOSIS — Z12.31 VISIT FOR SCREENING MAMMOGRAM: ICD-10-CM

## 2023-04-21 PROCEDURE — 77067 SCR MAMMO BI INCL CAD: CPT | Performed by: RADIOLOGY

## 2023-05-24 ENCOUNTER — APPOINTMENT (OUTPATIENT)
Dept: INTERPRETER SERVICES | Facility: CLINIC | Age: 63
End: 2023-05-24
Payer: COMMERCIAL

## 2023-06-16 ENCOUNTER — OFFICE VISIT (OUTPATIENT)
Dept: ORTHOPEDICS | Facility: CLINIC | Age: 63
End: 2023-06-16
Payer: COMMERCIAL

## 2023-06-16 DIAGNOSIS — M20.41 HAMMER TOES OF BOTH FEET: ICD-10-CM

## 2023-06-16 DIAGNOSIS — E11.65 TYPE II OR UNSPECIFIED TYPE DIABETES MELLITUS WITH NEUROLOGICAL MANIFESTATIONS, UNCONTROLLED(250.62) (H): ICD-10-CM

## 2023-06-16 DIAGNOSIS — L84 TYLOMA: ICD-10-CM

## 2023-06-16 DIAGNOSIS — B35.1 DERMATOPHYTOSIS OF NAIL: ICD-10-CM

## 2023-06-16 DIAGNOSIS — M20.42 HAMMER TOES OF BOTH FEET: ICD-10-CM

## 2023-06-16 DIAGNOSIS — Q66.6 PES PLANOVALGUS: Primary | ICD-10-CM

## 2023-06-16 DIAGNOSIS — E11.49 TYPE II OR UNSPECIFIED TYPE DIABETES MELLITUS WITH NEUROLOGICAL MANIFESTATIONS, UNCONTROLLED(250.62) (H): ICD-10-CM

## 2023-06-16 PROCEDURE — 11721 DEBRIDE NAIL 6 OR MORE: CPT | Mod: XS | Performed by: PODIATRIST

## 2023-06-16 PROCEDURE — 99213 OFFICE O/P EST LOW 20 MIN: CPT | Mod: 25 | Performed by: PODIATRIST

## 2023-06-16 PROCEDURE — 11055 PARING/CUTG B9 HYPRKER LES 1: CPT | Performed by: PODIATRIST

## 2023-06-16 RX ORDER — UREA 40 %
CREAM (GRAM) TOPICAL DAILY
Qty: 85 G | Refills: 11 | Status: SHIPPED | OUTPATIENT
Start: 2023-06-16

## 2023-06-16 RX ORDER — CAPSAICIN 0.025 %
1 CREAM (GRAM) TOPICAL 3 TIMES DAILY
Qty: 120 G | Refills: 11 | Status: SHIPPED | OUTPATIENT
Start: 2023-06-16

## 2023-06-16 NOTE — PROGRESS NOTES
Chief Complaint   Patient presents with     Follow Up     Diabetic foot care.             Allergies   Allergen Reactions     Ibuprofen Nausea and Vomiting     Hard on her stomach/digestion   LegacyRecord#[id number]         Subjective: Lucia is a 61 year old female who presents to the clinic today for a diabetic foot exam and management. She relates that she still does have neuropathic symptoms.  See use of the capsaicin cream.  This helpful.  She also has pain today in the plantar aspect of the left fifth metatarsal head.    Objective  Hemoglobin A1C   Date Value Ref Range Status   09/26/2005 5.6 4.3 - 6.0 % Final     Comment:     Assayed at [place] [address]   04/20/2005 6.3 (H) 4.3 - 6.0 % Final     Comment:     Assayed at [place] [address]     Contains abnormal data HEMOGLOBIN A1C  Order: [id number]  (suggestion)  Information displayed in this report will not trend or trigger automated decision support.    Ref Range & Units 3mo ago   Hemoglobin A1c, POC 0 - 6.4 % 8.6High     Comment: Normal <5.7% Prediabetes 5.7-6.4%  Diabetes 6.5% or higher - adopted from ADA   consensus guidelines.   Assayed at [place] [address]   Resulting Agency  [facility]   Specimen Collected: 11/17/20  4:57 PM Last Resulted: 11/17/20  5:16 PM   Received From: ERMELINDA  Result Received: 02/24/21  9:44 AM         PT and DP pulses are 1/4 bilaterally. CRT is >3 seconds. Diminished pedal hair.   Gross sensation is intact bilaterally. Protective sensation is intact as demonstrated with a 5.07G monofilament. Paresthesia not reproducible today. No Tinel's sign.   Equinus is noted bilaterally. No pain with active or passive ROM of the ankle, MTJ, 1st ray, or halluces bilaterally. Pes planus BL. Hammertoes BL  Nails thickened, yellow, brittle, with subungual debris bilaterally at the halluces, 2nd,  and 5th digits. Only right hallux and 2nd digit are elongated. Remaining nails are dystrophic. No open lesions are noted. Porokeratosis to the left plantar 5th met head noted.      Assessment: DM2  PVD  Onychomycosis x 6. Dystrophic nails x 4  Paresthesia - likely diabetic neuropathy  Pes planus with hammertoes.   Tyloma        Plan:  - Pt seen and evaluated.   - Rx for diabetic shoes.   -I sent in a prescription for capsaicin cream.  -Tyloma debrided x1.  - Nails were debrided x 8.   - See again in 4 months.

## 2023-06-16 NOTE — LETTER
6/16/2023         RE: Lucia Oliver  [address]  [place] [facility] [address]        Dear Colleague,    Thank you for referring your patient, Lucia Oliver, to the [facility] [place]. Please see a copy of my visit note below.    Chief Complaint   Patient presents with    Follow Up     Diabetic foot care.             Allergies   Allergen Reactions    Ibuprofen Nausea and Vomiting     Hard on her stomach/digestion   LegacyRecord#[id number]         Subjective: Lucia is a 61 year old female who presents to the clinic today for a diabetic foot exam and management. She relates that she still does have neuropathic symptoms.  See use of the capsaicin cream.  This helpful.  She also has pain today in the plantar aspect of the left fifth metatarsal head.    Objective  Hemoglobin A1C   Date Value Ref Range Status   09/26/2005 5.6 4.3 - 6.0 % Final     Comment:     Assayed at [place] [address]   04/20/2005 6.3 (H) 4.3 - 6.0 % Final     Comment:     Assayed at [place] [address]     Contains abnormal data HEMOGLOBIN A1C  Order: [id number]  (suggestion)  Information displayed in this report will not trend or trigger automated decision support.    Ref Range & Units 3mo ago   Hemoglobin A1c, POC 0 - 6.4 % 8.6High     Comment: Normal <5.7% Prediabetes 5.7-6.4%  Diabetes 6.5% or higher - adopted from ADA   consensus guidelines.   Assayed at [place] [address]   Resulting Agency  [facility]   Specimen Collected: 11/17/20  4:57 PM Last Resulted: 11/17/20  5:16 PM   Received From: ERMELINDA  Result Received: 02/24/21  9:44 AM         PT and DP pulses are 1/4 bilaterally. CRT is >3 seconds. Diminished pedal hair.   Gross sensation is intact bilaterally. Protective sensation is intact as demonstrated with a 5.07G monofilament. Paresthesia not reproducible today. No  Tinel's sign.   Equinus is noted bilaterally. No pain with active or passive ROM of the ankle, MTJ, 1st ray, or halluces bilaterally. Pes planus BL. Hammertoes BL  Nails thickened, yellow, brittle, with subungual debris bilaterally at the halluces, 2nd, and 5th digits. Only right hallux and 2nd digit are elongated. Remaining nails are dystrophic. No open lesions are noted. Porokeratosis to the left plantar 5th met head noted.      Assessment: DM2  PVD  Onychomycosis x 6. Dystrophic nails x 4  Paresthesia - likely diabetic neuropathy  Pes planus with hammertoes.   Tyloma        Plan:  - Pt seen and evaluated.   - Rx for diabetic shoes.   -I sent in a prescription for capsaicin cream.  -Tyloma debrided x1.  - Nails were debrided x 8.   - See again in 4 months.          Dimitri Sanchez DPM

## 2023-10-03 ENCOUNTER — APPOINTMENT (OUTPATIENT)
Dept: INTERPRETER SERVICES | Facility: CLINIC | Age: 63
End: 2023-10-03
Payer: COMMERCIAL

## 2023-10-23 ENCOUNTER — LAB REQUISITION (OUTPATIENT)
Dept: LAB | Facility: CLINIC | Age: 63
End: 2023-10-23
Payer: COMMERCIAL

## 2023-10-23 DIAGNOSIS — I10 ESSENTIAL (PRIMARY) HYPERTENSION: ICD-10-CM

## 2023-10-23 DIAGNOSIS — E78.5 HYPERLIPIDEMIA, UNSPECIFIED: ICD-10-CM

## 2023-10-23 DIAGNOSIS — Z11.59 ENCOUNTER FOR SCREENING FOR OTHER VIRAL DISEASES: ICD-10-CM

## 2023-10-23 LAB
ALBUMIN SERPL BCG-MCNC: 4.6 G/DL (ref 3.5–5.2)
ALP SERPL-CCNC: 45 U/L (ref 35–104)
ALT SERPL W P-5'-P-CCNC: 16 U/L (ref 0–50)
ANION GAP SERPL CALCULATED.3IONS-SCNC: 15 MMOL/L (ref 7–15)
AST SERPL W P-5'-P-CCNC: 25 U/L (ref 0–45)
BILIRUB SERPL-MCNC: 0.3 MG/DL
BUN SERPL-MCNC: 6.8 MG/DL (ref 8–23)
CALCIUM SERPL-MCNC: 9.8 MG/DL (ref 8.8–10.2)
CHLORIDE SERPL-SCNC: 96 MMOL/L (ref 98–107)
CHOLEST SERPL-MCNC: 155 MG/DL
CREAT SERPL-MCNC: 0.43 MG/DL (ref 0.51–0.95)
DEPRECATED HCO3 PLAS-SCNC: 24 MMOL/L (ref 22–29)
EGFRCR SERPLBLD CKD-EPI 2021: >90 ML/MIN/1.73M2
GLUCOSE SERPL-MCNC: 147 MG/DL (ref 70–99)
HBV SURFACE AB SERPL IA-ACNC: 46.28 M[IU]/ML
HBV SURFACE AB SERPL IA-ACNC: REACTIVE M[IU]/ML
HBV SURFACE AG SERPL QL IA: NONREACTIVE
HDLC SERPL-MCNC: 70 MG/DL
LDLC SERPL CALC-MCNC: 64 MG/DL
NONHDLC SERPL-MCNC: 85 MG/DL
POTASSIUM SERPL-SCNC: 4.6 MMOL/L (ref 3.4–5.3)
PROT SERPL-MCNC: 7.3 G/DL (ref 6.4–8.3)
SODIUM SERPL-SCNC: 135 MMOL/L (ref 135–145)
TRIGL SERPL-MCNC: 103 MG/DL

## 2023-10-23 PROCEDURE — 86706 HEP B SURFACE ANTIBODY: CPT | Mod: ORL | Performed by: FAMILY MEDICINE

## 2023-10-23 PROCEDURE — 87340 HEPATITIS B SURFACE AG IA: CPT | Mod: ORL | Performed by: FAMILY MEDICINE

## 2023-10-23 PROCEDURE — 80061 LIPID PANEL: CPT | Mod: ORL | Performed by: FAMILY MEDICINE

## 2023-10-23 PROCEDURE — 80053 COMPREHEN METABOLIC PANEL: CPT | Mod: ORL | Performed by: FAMILY MEDICINE

## 2024-03-07 NOTE — TELEPHONE ENCOUNTER
FUTURE VISIT INFORMATION      FUTURE VISIT INFORMATION:  Date: 5/21/24  Time: 10:20am  Location: [facility]  REFERRAL INFORMATION:  Referring provider:  Dr Patel   Referring providers clinic:  [facility]  Reason for visit/diagnosis  1 year V,T, D MRx at next visit, call if problems sooner to clinic.     RECORDS REQUESTED FROM:       Clinic name Comments Records Status Imaging Status   [facility] Ov/referral 1/7/23  Ov/notes 1/7/23-2/5/2008 EPIC

## 2024-03-27 ENCOUNTER — LAB REQUISITION (OUTPATIENT)
Dept: LAB | Facility: CLINIC | Age: 64
End: 2024-03-27
Payer: COMMERCIAL

## 2024-03-27 DIAGNOSIS — Z12.11 ENCOUNTER FOR SCREENING FOR MALIGNANT NEOPLASM OF COLON: ICD-10-CM

## 2024-03-27 PROCEDURE — 82274 ASSAY TEST FOR BLOOD FECAL: CPT | Mod: ORL | Performed by: INTERNAL MEDICINE

## 2024-03-28 LAB — HEMOCCULT STL QL IA: NEGATIVE

## 2024-05-15 ENCOUNTER — APPOINTMENT (OUTPATIENT)
Dept: INTERPRETER SERVICES | Facility: CLINIC | Age: 64
End: 2024-05-15
Payer: COMMERCIAL

## 2024-05-15 ENCOUNTER — TELEPHONE (OUTPATIENT)
Dept: OPHTHALMOLOGY | Facility: CLINIC | Age: 64
End: 2024-05-15
Payer: COMMERCIAL

## 2024-05-20 NOTE — PROGRESS NOTES
HPI  Lucia Oliver is a 63 year old female here for diabetic eye exam. She was seen using Ghanaian  on the iPad.  She states her vision has been overall stable in both eyes. No eye pain, redness, discharge. No flashes/floaters.    HPI       COMPREHENSIVE EYE EXAM    In both eyes.  Associated symptoms include dryness.  Treatments tried include no treatments.  Pain was noted as 0/10. Additional comments: Comprehensive exam/ Diabetic/ Glaucoma Suspect OU             Comments    Lab Results       Component                Value               Date                       A1C                      5.6                 09/26/2005                 A1C                      6.3                 04/20/2005            Left eye some discomfort for the past 2-3 days feels a irritated like the is something in it. Has not tried any drops for relief yet.     Used interpreting ipad for todays exam.     TRISHA Mayes COT 10:37 AM May 21, 2024                 Last edited by Leslie Gimenez COT on 5/21/2024 10:41 AM.            POH:  cataracts, conjunctival pigmenations left eye, myopic/presbyopia physiologic cupping with normal OCT rnfl  Oc medications:   none  FH:  no glaucoma or age related macular degeneration      Assessment & Plan      1. Combined form of age-related cataract, both eyes - Both Eyes    2. Type 2 diabetes mellitus without ophthalmic manifestations (H) - Both Eyes    3. Meibomian gland dysfunction (MGD) of upper and lower lids of both eyes - Both Eyes    4. Myopic astigmatism of both eyes - Both Eyes    5. Presbyopia of both eyes - Both Eyes        Cataracts both eyes (primary encounter diagnosis)  Comment: Mild, good best corrected visual acuity with updated manifest refraction.   Plan: Observe  Manifest refraction done and prescription for glasses given    Type 2 diabetes mellitus without retinopathy both eyes   Comment: On glyburide. good A1c. No diabetic retinopathy on exam today.  Plan: Discussed the  importance of tight blood glucose control in the prevention of diabetic retinopathy. Recommend yearly dilated eye exam.    (H02.88A,  H02.88B) Meibomian gland dysfunction (MGD) of upper and lower lids of both eyes - Both Eyes  Comment: mild , symptomatic left eye   Plan: natural history discussed with patient   Warm compresses daily  Lid scrubs -Ocusoft foam on warm washcloth to clean lid margins carefully from side to side  Artificial tears 4-6 times per day as needed for discomfort  Consider ointment course if flare occurs      (H52.13,  H52.203) Myopia with astigmatism and presbyopia, bilateral - Both Eyes  Comment: Improved vision with refraction  Plan: Given updated glasses prescription      -----------------------------------------------------------------------------------    Patient disposition:   Return in about 1 year (around 5/21/2025) for Comprehensive Exam. or sooner as needed.      Teaching statement:  Complete documentation of historical and exam elements from today's encounter can be found in the full encounter summary report (not reduplicated in this progress note). I personally obtained the chief complaint(s) and history of present illness.  I confirmed and edited as necessary the review of systems, past medical/surgical history, family history, social history, and examination findings as documented by others; and I examined the patient myself. I personally reviewed the relevant tests, images, and reports as documented above.   I formulated and edited as necessary the assessment and plan and discussed the findings and management plan with the patient and family.    Aissatou Resendiz MD

## 2024-05-21 ENCOUNTER — OFFICE VISIT (OUTPATIENT)
Dept: OPHTHALMOLOGY | Facility: CLINIC | Age: 64
End: 2024-05-21
Payer: COMMERCIAL

## 2024-05-21 ENCOUNTER — PRE VISIT (OUTPATIENT)
Dept: OPHTHALMOLOGY | Facility: CLINIC | Age: 64
End: 2024-05-21

## 2024-05-21 DIAGNOSIS — H25.813 COMBINED FORM OF AGE-RELATED CATARACT, BOTH EYES: Primary | ICD-10-CM

## 2024-05-21 DIAGNOSIS — H52.4 PRESBYOPIA OF BOTH EYES: ICD-10-CM

## 2024-05-21 DIAGNOSIS — H02.88A MEIBOMIAN GLAND DYSFUNCTION (MGD) OF UPPER AND LOWER LIDS OF BOTH EYES: ICD-10-CM

## 2024-05-21 DIAGNOSIS — E11.9 TYPE 2 DIABETES MELLITUS WITHOUT OPHTHALMIC MANIFESTATIONS (H): ICD-10-CM

## 2024-05-21 DIAGNOSIS — H52.203 MYOPIC ASTIGMATISM OF BOTH EYES: ICD-10-CM

## 2024-05-21 DIAGNOSIS — H52.13 MYOPIC ASTIGMATISM OF BOTH EYES: ICD-10-CM

## 2024-05-21 DIAGNOSIS — H02.88B MEIBOMIAN GLAND DYSFUNCTION (MGD) OF UPPER AND LOWER LIDS OF BOTH EYES: ICD-10-CM

## 2024-05-21 PROCEDURE — 92014 COMPRE OPH EXAM EST PT 1/>: CPT | Performed by: OPHTHALMOLOGY

## 2024-05-21 PROCEDURE — 92015 DETERMINE REFRACTIVE STATE: CPT | Performed by: OPHTHALMOLOGY

## 2024-05-21 ASSESSMENT — VISUAL ACUITY
OD_SC: 20/40
OD_SC: J5
METHOD: SNELLEN - LINEAR
OS_SC: J5
OS_SC: 20/50
OS_SC+: +2

## 2024-05-21 ASSESSMENT — CONF VISUAL FIELD
OS_INFERIOR_NASAL_RESTRICTION: 0
OS_SUPERIOR_TEMPORAL_RESTRICTION: 0
OD_INFERIOR_TEMPORAL_RESTRICTION: 0
METHOD: COUNTING FINGERS
OD_SUPERIOR_TEMPORAL_RESTRICTION: 0
OD_NORMAL: 1
OS_INFERIOR_TEMPORAL_RESTRICTION: 0
OS_NORMAL: 1
OS_SUPERIOR_NASAL_RESTRICTION: 0
OD_SUPERIOR_NASAL_RESTRICTION: 0
OD_INFERIOR_NASAL_RESTRICTION: 0

## 2024-05-21 ASSESSMENT — REFRACTION_MANIFEST
OS_ADD: +2.50
OD_ADD: +2.50
OD_CYLINDER: +0.75
OD_SPHERE: +0.25
OS_CYLINDER: +1.75
OS_AXIS: 170
OD_AXIS: 010
OS_SPHERE: -0.75

## 2024-05-21 ASSESSMENT — TONOMETRY
OS_IOP_MMHG: 17
IOP_METHOD: TONOPEN
OD_IOP_MMHG: 18

## 2024-05-21 ASSESSMENT — EXTERNAL EXAM - RIGHT EYE: OD_EXAM: NORMAL

## 2024-05-21 ASSESSMENT — CUP TO DISC RATIO
OD_RATIO: 0.5
OS_RATIO: 0.6

## 2024-05-21 ASSESSMENT — EXTERNAL EXAM - LEFT EYE: OS_EXAM: NORMAL

## 2024-05-21 NOTE — NURSING NOTE
Chief Complaints and History of Present Illnesses   Patient presents with    COMPREHENSIVE EYE EXAM     Comprehensive exam/ Diabetic/ Glaucoma Suspect OU     Chief Complaint(s) and History of Present Illness(es)       COMPREHENSIVE EYE EXAM              Laterality: both eyes    Comments: Comprehensive exam/ Diabetic/ Glaucoma Suspect OU              Comments    Lab Results       Component                Value               Date                       A1C                      5.6                 09/26/2005                 A1C                      6.3                 04/20/2005            Left eye some discomfort for the past 2-3 days feels a irritated like the is something in it. Has not tried any drops for relief yet.     Used interpreting ipad for todays exam.     TRISHA Mayes COT 10:37 AM May 21, 2024

## 2024-05-21 NOTE — PATIENT INSTRUCTIONS
"Instructions for your blepharitis:  Follow these steps before bed:     1.  Warm the eyelids for 5 minutes with a hot wet cloth -- as hot as you can but not so hot that you burn yourself.  An easy way to make a long-lasting warm compress is use a Heladio eye mask or microwavable gel bead mask ([employer], or most SNTMNT)  If you use the microwave to heat anything, be VERY CAREFUL that it is not too hot as microwaved foods and cloths can have very uneven hot spots that pose a burn hazard.       2.  After the eyelids are soft and refreshed from the hot compress, clean the debris from the glands at the bases of the eyelashes.  With a warm wet washcloth wrapped around your index finger, use the tip of your finger to scrub the bases of the eyelashes.  The principle is similar to brushing your teeth but here you can use a side-to-side motion.  Perform ten strokes per eyelid across the entire length of the eyelid. You can use plain water for this brushing but many patients claim better results if they use a drop of Gunnar's or Aveeno \"tear-free\" baby shampoo in a glass of water.  Alternatively, you may try Ocusoft eye soap (comes as a foam pump or towelettes).    This cleaning dislodges and removes the caked-in secretions in the gland and debris on the eyelids.  Do NOT wash the EYEBALL.    3.  To rinse your eye, use over the counter artificial tear drops liberally after eyelid scrubs (and also in between up to 4-6 times daily) in both eyes.  Use a preservative-free eyedrop if using more frequently than that.  Any brand is ok to try, there are minimal differences.  Preservative-free drops (best if using more than 6 x daily) brands include:  Refresh, Systane, Celluvisc, Blink, Optive.  Do NOT use Visine, Clear Eyes, or any \"anti-redness\" eye drops.  These can worsen your eye redness and irritation over time.      If these measures are not helping, we may need to take other measurements to get your eye comfortable.  Please " call the clinic if you need a sooner appointment.

## 2024-06-07 ENCOUNTER — APPOINTMENT (OUTPATIENT)
Dept: INTERPRETER SERVICES | Facility: CLINIC | Age: 64
End: 2024-06-07
Payer: COMMERCIAL

## 2024-08-29 ENCOUNTER — LAB REQUISITION (OUTPATIENT)
Dept: LAB | Facility: CLINIC | Age: 64
End: 2024-08-29
Payer: COMMERCIAL

## 2024-08-29 ENCOUNTER — MEDICAL CORRESPONDENCE (OUTPATIENT)
Dept: HEALTH INFORMATION MANAGEMENT | Facility: CLINIC | Age: 64
End: 2024-08-29

## 2024-08-29 DIAGNOSIS — R63.4 ABNORMAL WEIGHT LOSS: ICD-10-CM

## 2024-08-29 LAB
ALBUMIN SERPL BCG-MCNC: 4.7 G/DL (ref 3.5–5.2)
ALP SERPL-CCNC: 47 U/L (ref 40–150)
ALT SERPL W P-5'-P-CCNC: 18 U/L (ref 0–50)
ANION GAP SERPL CALCULATED.3IONS-SCNC: 15 MMOL/L (ref 7–15)
AST SERPL W P-5'-P-CCNC: 24 U/L (ref 0–45)
BASOPHILS # BLD AUTO: 0 10E3/UL (ref 0–0.2)
BASOPHILS NFR BLD AUTO: 1 %
BILIRUB SERPL-MCNC: 0.3 MG/DL
BUN SERPL-MCNC: 7.4 MG/DL (ref 8–23)
CALCIUM SERPL-MCNC: 9.7 MG/DL (ref 8.8–10.4)
CHLORIDE SERPL-SCNC: 93 MMOL/L (ref 98–107)
CREAT SERPL-MCNC: 0.46 MG/DL (ref 0.51–0.95)
EGFRCR SERPLBLD CKD-EPI 2021: >90 ML/MIN/1.73M2
EOSINOPHIL # BLD AUTO: 0.1 10E3/UL (ref 0–0.7)
EOSINOPHIL NFR BLD AUTO: 2 %
ERYTHROCYTE [DISTWIDTH] IN BLOOD BY AUTOMATED COUNT: 12.8 % (ref 10–15)
GLUCOSE SERPL-MCNC: 113 MG/DL (ref 70–99)
HCO3 SERPL-SCNC: 25 MMOL/L (ref 22–29)
HCT VFR BLD AUTO: 37.1 % (ref 35–47)
HCV AB SERPL QL IA: NONREACTIVE
HGB BLD-MCNC: 12.2 G/DL (ref 11.7–15.7)
HIV 1+2 AB+HIV1 P24 AG SERPL QL IA: NONREACTIVE
IMM GRANULOCYTES # BLD: 0 10E3/UL
IMM GRANULOCYTES NFR BLD: 1 %
LYMPHOCYTES # BLD AUTO: 1.4 10E3/UL (ref 0.8–5.3)
LYMPHOCYTES NFR BLD AUTO: 21 %
MCH RBC QN AUTO: 32.3 PG (ref 26.5–33)
MCHC RBC AUTO-ENTMCNC: 32.9 G/DL (ref 31.5–36.5)
MCV RBC AUTO: 98 FL (ref 78–100)
MONOCYTES # BLD AUTO: 0.7 10E3/UL (ref 0–1.3)
MONOCYTES NFR BLD AUTO: 10 %
NEUTROPHILS # BLD AUTO: 4.3 10E3/UL (ref 1.6–8.3)
NEUTROPHILS NFR BLD AUTO: 65 %
NRBC # BLD AUTO: 0 10E3/UL
NRBC BLD AUTO-RTO: 0 /100
PLATELET # BLD AUTO: 334 10E3/UL (ref 150–450)
POTASSIUM SERPL-SCNC: 3.9 MMOL/L (ref 3.4–5.3)
PROT SERPL-MCNC: 7.4 G/DL (ref 6.4–8.3)
RBC # BLD AUTO: 3.78 10E6/UL (ref 3.8–5.2)
SODIUM SERPL-SCNC: 133 MMOL/L (ref 135–145)
T4 FREE SERPL-MCNC: 1.82 NG/DL (ref 0.9–1.7)
TSH SERPL DL<=0.005 MIU/L-ACNC: 0.05 UIU/ML (ref 0.3–4.2)
WBC # BLD AUTO: 6.5 10E3/UL (ref 4–11)

## 2024-08-29 PROCEDURE — 80053 COMPREHEN METABOLIC PANEL: CPT | Mod: ORL | Performed by: STUDENT IN AN ORGANIZED HEALTH CARE EDUCATION/TRAINING PROGRAM

## 2024-08-29 PROCEDURE — 86803 HEPATITIS C AB TEST: CPT | Mod: ORL | Performed by: STUDENT IN AN ORGANIZED HEALTH CARE EDUCATION/TRAINING PROGRAM

## 2024-08-29 PROCEDURE — 87389 HIV-1 AG W/HIV-1&-2 AB AG IA: CPT | Mod: ORL | Performed by: STUDENT IN AN ORGANIZED HEALTH CARE EDUCATION/TRAINING PROGRAM

## 2024-08-29 PROCEDURE — 84439 ASSAY OF FREE THYROXINE: CPT | Mod: ORL | Performed by: STUDENT IN AN ORGANIZED HEALTH CARE EDUCATION/TRAINING PROGRAM

## 2024-08-29 PROCEDURE — 85025 COMPLETE CBC W/AUTO DIFF WBC: CPT | Mod: ORL | Performed by: STUDENT IN AN ORGANIZED HEALTH CARE EDUCATION/TRAINING PROGRAM

## 2024-08-29 PROCEDURE — 84443 ASSAY THYROID STIM HORMONE: CPT | Mod: ORL | Performed by: STUDENT IN AN ORGANIZED HEALTH CARE EDUCATION/TRAINING PROGRAM

## 2024-08-30 ENCOUNTER — TRANSCRIBE ORDERS (OUTPATIENT)
Dept: OTHER | Age: 64
End: 2024-08-30

## 2024-08-30 DIAGNOSIS — R79.89 LOW TSH LEVEL: Primary | ICD-10-CM

## 2024-08-30 DIAGNOSIS — R63.4 WEIGHT LOSS: ICD-10-CM

## 2024-08-30 NOTE — TELEPHONE ENCOUNTER
DIAGNOSIS: diabetic foot check/self/hp/no imgs/pod cons    APPOINTMENT DATE: 9/9/24   NOTES STATUS DETAILS   OFFICE NOTE from referring provider N/A Self- referral    MEDICATION LIST Internal

## 2024-09-03 ENCOUNTER — ANCILLARY PROCEDURE (OUTPATIENT)
Dept: GENERAL RADIOLOGY | Facility: CLINIC | Age: 64
End: 2024-09-03
Payer: COMMERCIAL

## 2024-09-03 DIAGNOSIS — R05.2 SUBACUTE COUGH: ICD-10-CM

## 2024-09-03 DIAGNOSIS — R63.4 LOSS OF WEIGHT: ICD-10-CM

## 2024-09-03 PROCEDURE — 71046 X-RAY EXAM CHEST 2 VIEWS: CPT | Performed by: RADIOLOGY

## 2024-09-09 ENCOUNTER — OFFICE VISIT (OUTPATIENT)
Dept: ORTHOPEDICS | Facility: CLINIC | Age: 64
End: 2024-09-09
Payer: COMMERCIAL

## 2024-09-09 ENCOUNTER — PRE VISIT (OUTPATIENT)
Dept: ORTHOPEDICS | Facility: CLINIC | Age: 64
End: 2024-09-09

## 2024-09-09 DIAGNOSIS — M20.42 HAMMER TOES OF BOTH FEET: ICD-10-CM

## 2024-09-09 DIAGNOSIS — E11.49 TYPE II OR UNSPECIFIED TYPE DIABETES MELLITUS WITH NEUROLOGICAL MANIFESTATIONS, UNCONTROLLED(250.62) (H): Primary | ICD-10-CM

## 2024-09-09 DIAGNOSIS — L84 TYLOMA: ICD-10-CM

## 2024-09-09 DIAGNOSIS — M20.41 HAMMER TOES OF BOTH FEET: ICD-10-CM

## 2024-09-09 DIAGNOSIS — E11.65 TYPE II OR UNSPECIFIED TYPE DIABETES MELLITUS WITH NEUROLOGICAL MANIFESTATIONS, UNCONTROLLED(250.62) (H): Primary | ICD-10-CM

## 2024-09-09 PROCEDURE — 11056 PARNG/CUTG B9 HYPRKR LES 2-4: CPT | Performed by: PODIATRIST

## 2024-09-09 PROCEDURE — 99214 OFFICE O/P EST MOD 30 MIN: CPT | Mod: 25 | Performed by: PODIATRIST

## 2024-09-09 RX ORDER — UREA 200 MG/G
CREAM TOPICAL PRN
Qty: 85 G | Refills: 5 | Status: SHIPPED | OUTPATIENT
Start: 2024-09-09

## 2024-09-09 NOTE — LETTER
9/9/2024      Lucia Oliver  [address]  [facility] [address]      Dear Colleague,    Thank you for referring your patient, Lucia Oliver, to the [facility] [place]. Please see a copy of my visit note below.    Past Medical History:   Diagnosis Date    Asthma     Cataract     Diabetes mellitus type II     Essential hypertension, benign     High cholesterol     Numbness in both hands     Unspecified asthma(493.90)      Patient Active Problem List   Diagnosis    Essential hypertension, benign    Diabetes mellitus, type 2 (H)    Asthma    Dermatophytosis of nail    Tyloma    PVD (peripheral vascular disease) (H24)    Pes planovalgus    Hammer toes of both feet     No past surgical history on file.  Social History     Socioeconomic History    Marital status:      Spouse name: Not on file    Number of children: Not on file    Years of education: Not on file    Highest education level: Not on file   Occupational History    Not on file   Tobacco Use    Smoking status: Never    Smokeless tobacco: Never   Substance and Sexual Activity    Alcohol use: No    Drug use: No    Sexual activity: Not on file   Other Topics Concern    Not on file   Social History Narrative    Not on file     Social Determinants of Health     Financial Resource Strain: Not on File (11/8/2019)    Received from [facility]    Financial Resource Strain     Financial Resource Strain: 0   Food Insecurity: Not on file (9/3/2024)   Transportation Needs: Not on File (11/8/2019)    Received from [facility]    Transportation Needs     Transportation: 0   Physical Activity: Not on File (11/8/2019)    Received from [facility]    Physical Activity     Physical Activity: 0   Stress: Not on File (11/8/2019)    Received from [facility]    Stress     Stress: 0   Social Connections: Not on File (11/8/2019)    Received from [facility]    Social Connections     Social Connections and Isolation: 0   Interpersonal Safety: Not  on file   Housing Stability: Not on File (2019)    Received from [facility]    Housing Stability     Housin     Family History   Problem Relation Age of Onset    Glaucoma No family hx of     Macular Degeneration No family hx of        3/22/2024 A1c 6.2 as noted in the EMR.  Last Comprehensive Metabolic Panel:  Lab Results   Component Value Date     (L) 2024    POTASSIUM 3.9 2024    CHLORIDE 93 (L) 2024    CO2 25 2024    ANIONGAP 15 2024     (H) 2024    BUN 7.4 (L) 2024    CR 0.46 (L) 2024    GFRESTIMATED >90 2024    LUIS 9.7 2024               Subjective findings 64-year-old presents with iPad  for diabetic foot cares.  She relates she is Diabetic, usually sees Dr. Sanchez, I reviewed Dr. Francis 2023 note.  Relates she usually gets her calluses trimmed, relates her feet are numb, relates to no ulcers or sores, relates she usually gets diabetic shoes she needs a new prescription for that as well, relates to no injuries.    Objective findings- DP and PT are 2 out of 4 bilaterally.  Has dorsal contracted digits 2 through 5 bilaterally.  Has nucleated hyperkeratotic tissue buildup plantar fifth MPJ bilaterally.  Has dystrophic discolored thickened nails with subungual debris and dystrophy to differing degrees 1 through 5 bilaterally.  Sharp/dull is decreased with 5.07 Symes Jaspreet monofilament in the plantar arch bilaterally otherwise intact bilaterally, proprioception is intact bilaterally, deep tendon reflexes are intact bilaterally, no gross tendon voids bilaterally.  There is no erythema, no edema, no drainage, no odor, no calor, no pain on palpation bilaterally.    Assessment and plan Tylomas fifth MPJ bilaterally, Diabetes with peripheral Neuropathy, Diabetes with Hammertoes.  Diagnosis and treatment options discussed with the patient.  Tylomas bilaterally were sharp debrided with a 10 blade upon consent.   Prescription for Diabetic shoes and insoles given and the patient is given the phone number address orthotics prosthetics lab for these.  Prescription for Urea cream given use discussed with the patient.  Previous notes reviewed.  Return to clinic and see me in 3 months.      Moderate level of medical decision making.      Again, thank you for allowing me to participate in the care of your patient.        Sincerely,        Dario Fonseca DPM

## 2024-09-09 NOTE — PROGRESS NOTES
Past Medical History:   Diagnosis Date    Asthma     Cataract     Diabetes mellitus type II     Essential hypertension, benign     High cholesterol     Numbness in both hands     Unspecified asthma(493.90)      Patient Active Problem List   Diagnosis    Essential hypertension, benign    Diabetes mellitus, type 2 (H)    Asthma    Dermatophytosis of nail    Tyloma    PVD (peripheral vascular disease) (H24)    Pes planovalgus    Hammer toes of both feet     No past surgical history on file.  Social History     Socioeconomic History    Marital status:      Spouse name: Not on file    Number of children: Not on file    Years of education: Not on file    Highest education level: Not on file   Occupational History    Not on file   Tobacco Use    Smoking status: Never    Smokeless tobacco: Never   Substance and Sexual Activity    Alcohol use: No    Drug use: No    Sexual activity: Not on file   Other Topics Concern    Not on file   Social History Narrative    Not on file     Social Determinants of Health     Financial Resource Strain: Not on File (2019)    Received from [facility]    Financial Resource Strain     Financial Resource Strain: 0   Food Insecurity: Not on file (9/3/2024)   Transportation Needs: Not on File (2019)    Received from [facility]    Transportation Needs     Transportation: 0   Physical Activity: Not on File (2019)    Received from [facility]    Physical Activity     Physical Activity: 0   Stress: Not on File (2019)    Received from [facility]    Stress     Stress: 0   Social Connections: Not on File (2019)    Received from [facility]    Social Connections     Social Connections and Isolation: 0   Interpersonal Safety: Not on file   Housing Stability: Not on File (2019)    Received from [facility]    Housing Stability     Housin     Family History   Problem Relation Age of Onset    Glaucoma No family hx of     Macular Degeneration No family hx of         3/22/2024 A1c 6.2 as noted in the EMR.  Last Comprehensive Metabolic Panel:  Lab Results   Component Value Date     (L) 08/29/2024    POTASSIUM 3.9 08/29/2024    CHLORIDE 93 (L) 08/29/2024    CO2 25 08/29/2024    ANIONGAP 15 08/29/2024     (H) 08/29/2024    BUN 7.4 (L) 08/29/2024    CR 0.46 (L) 08/29/2024    GFRESTIMATED >90 08/29/2024    LUIS 9.7 08/29/2024               Subjective findings 64-year-old presents with iPad  for diabetic foot cares.  She relates she is Diabetic, usually sees Dr. Sanchez, I reviewed Dr. Francis 6/16/2023 note.  Relates she usually gets her calluses trimmed, relates her feet are numb, relates to no ulcers or sores, relates she usually gets diabetic shoes she needs a new prescription for that as well, relates to no injuries.    Objective findings- DP and PT are 2 out of 4 bilaterally.  Has dorsal contracted digits 2 through 5 bilaterally.  Has nucleated hyperkeratotic tissue buildup plantar fifth MPJ bilaterally.  Has dystrophic discolored thickened nails with subungual debris and dystrophy to differing degrees 1 through 5 bilaterally.  Sharp/dull is decreased with 5.07 Symes Jaspreet monofilament in the plantar arch bilaterally otherwise intact bilaterally, proprioception is intact bilaterally, deep tendon reflexes are intact bilaterally, no gross tendon voids bilaterally.  There is no erythema, no edema, no drainage, no odor, no calor, no pain on palpation bilaterally.    Assessment and plan Tylomas fifth MPJ bilaterally, Diabetes with peripheral Neuropathy, Diabetes with Hammertoes.  Diagnosis and treatment options discussed with the patient.  Tylomas bilaterally were sharp debrided with a 10 blade upon consent.  Prescription for Diabetic shoes and insoles given and the patient is given the phone number address orthotics prosthetics lab for these.  Prescription for Urea cream given use discussed with the patient.  Previous notes reviewed.  Return to  clinic and see me in 3 months.                                                                  Moderate level of medical decision making.

## 2024-09-10 ENCOUNTER — TELEPHONE (OUTPATIENT)
Dept: PODIATRY | Facility: CLINIC | Age: 64
End: 2024-09-10

## 2024-09-12 ENCOUNTER — LAB REQUISITION (OUTPATIENT)
Dept: LAB | Facility: CLINIC | Age: 64
End: 2024-09-12
Payer: COMMERCIAL

## 2024-09-12 DIAGNOSIS — E11.9 TYPE 2 DIABETES MELLITUS WITHOUT COMPLICATIONS (H): ICD-10-CM

## 2024-09-12 PROCEDURE — 82570 ASSAY OF URINE CREATININE: CPT | Mod: ORL | Performed by: STUDENT IN AN ORGANIZED HEALTH CARE EDUCATION/TRAINING PROGRAM

## 2024-09-12 NOTE — TELEPHONE ENCOUNTER
Central Prior Authorization Team  Phone: [phone]    PA Initiation    Medication: UREA 20 INTENSIVE HYDRATING 20 % EX CREA  Insurance Company: [employer] - Phone [phone] Fax [phone]  Pharmacy Filling the Rx: [id number] [address]  Filling Pharmacy Phone: [phone]  Filling Pharmacy Fax:    Start Date: 9/12/2024

## 2024-09-13 LAB
CREAT UR-MCNC: 20.9 MG/DL
MICROALBUMIN UR-MCNC: <12 MG/L
MICROALBUMIN/CREAT UR: NORMAL MG/G{CREAT}

## 2024-09-20 ENCOUNTER — ANCILLARY PROCEDURE (OUTPATIENT)
Dept: CT IMAGING | Facility: CLINIC | Age: 64
End: 2024-09-20
Payer: COMMERCIAL

## 2024-09-20 DIAGNOSIS — R91.8 PULMONARY NODULES: ICD-10-CM

## 2024-09-20 PROCEDURE — 71260 CT THORAX DX C+: CPT | Mod: GC | Performed by: RADIOLOGY

## 2024-09-20 RX ORDER — IOPAMIDOL 755 MG/ML
46 INJECTION, SOLUTION INTRAVASCULAR ONCE
Status: COMPLETED | OUTPATIENT
Start: 2024-09-20 | End: 2024-09-20

## 2024-09-20 RX ADMIN — IOPAMIDOL 46 ML: 755 INJECTION, SOLUTION INTRAVASCULAR at 18:04

## 2024-09-20 NOTE — DISCHARGE INSTRUCTIONS

## 2024-09-30 ENCOUNTER — LAB REQUISITION (OUTPATIENT)
Dept: LAB | Facility: CLINIC | Age: 64
End: 2024-09-30
Payer: COMMERCIAL

## 2024-09-30 DIAGNOSIS — I10 ESSENTIAL (PRIMARY) HYPERTENSION: ICD-10-CM

## 2024-09-30 DIAGNOSIS — E11.9 TYPE 2 DIABETES MELLITUS WITHOUT COMPLICATIONS (H): ICD-10-CM

## 2024-09-30 PROCEDURE — 80053 COMPREHEN METABOLIC PANEL: CPT | Mod: ORL | Performed by: FAMILY MEDICINE

## 2024-10-01 LAB
ALBUMIN SERPL BCG-MCNC: 4.6 G/DL (ref 3.5–5.2)
ALP SERPL-CCNC: 43 U/L (ref 40–150)
ALT SERPL W P-5'-P-CCNC: 14 U/L (ref 0–50)
ANION GAP SERPL CALCULATED.3IONS-SCNC: 16 MMOL/L (ref 7–15)
AST SERPL W P-5'-P-CCNC: 20 U/L (ref 0–45)
BILIRUB SERPL-MCNC: 0.2 MG/DL
BUN SERPL-MCNC: 5.9 MG/DL (ref 8–23)
CALCIUM SERPL-MCNC: 9.6 MG/DL (ref 8.8–10.4)
CHLORIDE SERPL-SCNC: 95 MMOL/L (ref 98–107)
CREAT SERPL-MCNC: 0.47 MG/DL (ref 0.51–0.95)
EGFRCR SERPLBLD CKD-EPI 2021: >90 ML/MIN/1.73M2
GLUCOSE SERPL-MCNC: 156 MG/DL (ref 70–99)
HCO3 SERPL-SCNC: 23 MMOL/L (ref 22–29)
POTASSIUM SERPL-SCNC: 4 MMOL/L (ref 3.4–5.3)
PROT SERPL-MCNC: 7.2 G/DL (ref 6.4–8.3)
SODIUM SERPL-SCNC: 134 MMOL/L (ref 135–145)

## 2024-11-11 ENCOUNTER — LAB REQUISITION (OUTPATIENT)
Dept: LAB | Facility: CLINIC | Age: 64
End: 2024-11-11
Payer: COMMERCIAL

## 2024-11-11 DIAGNOSIS — R30.0 DYSURIA: ICD-10-CM

## 2024-11-11 PROCEDURE — 87086 URINE CULTURE/COLONY COUNT: CPT | Mod: ORL | Performed by: FAMILY MEDICINE

## 2024-11-13 LAB — BACTERIA UR CULT: NORMAL

## 2024-12-09 ENCOUNTER — LAB REQUISITION (OUTPATIENT)
Dept: LAB | Facility: CLINIC | Age: 64
End: 2024-12-09
Payer: COMMERCIAL

## 2024-12-09 DIAGNOSIS — R05.2 SUBACUTE COUGH: ICD-10-CM

## 2024-12-09 PROCEDURE — 87798 DETECT AGENT NOS DNA AMP: CPT | Mod: ORL | Performed by: FAMILY MEDICINE

## 2024-12-10 LAB
B PARAPERT DNA SPEC QL NAA+PROBE: NOT DETECTED
B PERT DNA SPEC QL NAA+PROBE: NOT DETECTED

## 2025-02-04 ENCOUNTER — LAB REQUISITION (OUTPATIENT)
Dept: LAB | Facility: CLINIC | Age: 65
End: 2025-02-04
Payer: COMMERCIAL

## 2025-02-04 DIAGNOSIS — R05.2 SUBACUTE COUGH: ICD-10-CM

## 2025-02-06 LAB — BACTERIA SPEC CULT: NORMAL

## 2025-02-17 ENCOUNTER — LAB REQUISITION (OUTPATIENT)
Dept: LAB | Facility: CLINIC | Age: 65
End: 2025-02-17
Payer: COMMERCIAL

## 2025-02-17 ENCOUNTER — MEDICAL CORRESPONDENCE (OUTPATIENT)
Dept: HEALTH INFORMATION MANAGEMENT | Facility: CLINIC | Age: 65
End: 2025-02-17

## 2025-02-17 DIAGNOSIS — I10 ESSENTIAL (PRIMARY) HYPERTENSION: ICD-10-CM

## 2025-02-17 DIAGNOSIS — E11.9 TYPE 2 DIABETES MELLITUS WITHOUT COMPLICATIONS (H): ICD-10-CM

## 2025-02-17 LAB
ALBUMIN SERPL BCG-MCNC: 4.6 G/DL (ref 3.5–5.2)
ALP SERPL-CCNC: 55 U/L (ref 40–150)
ALT SERPL W P-5'-P-CCNC: 15 U/L (ref 0–50)
ANION GAP SERPL CALCULATED.3IONS-SCNC: 16 MMOL/L (ref 7–15)
AST SERPL W P-5'-P-CCNC: 24 U/L (ref 0–45)
BILIRUB SERPL-MCNC: 0.3 MG/DL
BUN SERPL-MCNC: 6.4 MG/DL (ref 8–23)
CALCIUM SERPL-MCNC: 10.1 MG/DL (ref 8.8–10.4)
CHLORIDE SERPL-SCNC: 92 MMOL/L (ref 98–107)
CREAT SERPL-MCNC: 0.46 MG/DL (ref 0.51–0.95)
EGFRCR SERPLBLD CKD-EPI 2021: >90 ML/MIN/1.73M2
GLUCOSE SERPL-MCNC: 114 MG/DL (ref 70–99)
HCO3 SERPL-SCNC: 25 MMOL/L (ref 22–29)
POTASSIUM SERPL-SCNC: 4.1 MMOL/L (ref 3.4–5.3)
PROT SERPL-MCNC: 7.7 G/DL (ref 6.4–8.3)
SODIUM SERPL-SCNC: 133 MMOL/L (ref 135–145)

## 2025-02-17 PROCEDURE — 80053 COMPREHEN METABOLIC PANEL: CPT | Mod: ORL | Performed by: FAMILY MEDICINE

## 2025-02-19 ENCOUNTER — PATIENT OUTREACH (OUTPATIENT)
Dept: CARE COORDINATION | Facility: CLINIC | Age: 65
End: 2025-02-19
Payer: COMMERCIAL

## 2025-03-07 ENCOUNTER — ANCILLARY PROCEDURE (OUTPATIENT)
Dept: MAMMOGRAPHY | Facility: CLINIC | Age: 65
End: 2025-03-07
Attending: FAMILY MEDICINE
Payer: COMMERCIAL

## 2025-03-07 DIAGNOSIS — Z12.31 VISIT FOR SCREENING MAMMOGRAM: ICD-10-CM

## 2025-03-07 PROCEDURE — T1013 SIGN LANG/ORAL INTERPRETER: HCPCS | Mod: U3

## 2025-03-07 PROCEDURE — 77063 BREAST TOMOSYNTHESIS BI: CPT | Mod: GC | Performed by: RADIOLOGY

## 2025-03-07 PROCEDURE — 77067 SCR MAMMO BI INCL CAD: CPT | Mod: GC | Performed by: RADIOLOGY

## 2025-03-29 ENCOUNTER — OFFICE VISIT (OUTPATIENT)
Dept: OPHTHALMOLOGY | Facility: CLINIC | Age: 65
End: 2025-03-29
Payer: COMMERCIAL

## 2025-03-29 DIAGNOSIS — Z83.511 FAMILY HISTORY OF GLAUCOMA: ICD-10-CM

## 2025-03-29 DIAGNOSIS — H52.13 MYOPIC ASTIGMATISM OF BOTH EYES: Primary | ICD-10-CM

## 2025-03-29 DIAGNOSIS — H52.203 MYOPIC ASTIGMATISM OF BOTH EYES: Primary | ICD-10-CM

## 2025-03-29 DIAGNOSIS — D31.02 NEVUS OF CONJUNCTIVA, LEFT: ICD-10-CM

## 2025-03-29 DIAGNOSIS — H02.88B MEIBOMIAN GLAND DYSFUNCTION (MGD) OF UPPER AND LOWER LIDS OF BOTH EYES: ICD-10-CM

## 2025-03-29 DIAGNOSIS — H02.88A MEIBOMIAN GLAND DYSFUNCTION (MGD) OF UPPER AND LOWER LIDS OF BOTH EYES: ICD-10-CM

## 2025-03-29 DIAGNOSIS — H52.4 PRESBYOPIA OF BOTH EYES: ICD-10-CM

## 2025-03-29 DIAGNOSIS — E11.9 TYPE 2 DIABETES MELLITUS WITHOUT OPHTHALMIC MANIFESTATIONS (H): ICD-10-CM

## 2025-03-29 DIAGNOSIS — H25.813 COMBINED FORM OF AGE-RELATED CATARACT, BOTH EYES: ICD-10-CM

## 2025-03-29 PROCEDURE — 92014 COMPRE OPH EXAM EST PT 1/>: CPT

## 2025-03-29 PROCEDURE — 92133 CPTRZD OPH DX IMG PST SGM ON: CPT

## 2025-03-29 RX ORDER — LOVASTATIN 20 MG/1
2 TABLET ORAL DAILY
COMMUNITY
Start: 2025-02-17

## 2025-03-29 RX ORDER — CALCIUM CARBONATE/VITAMIN D3 600 MG-10
1 TABLET ORAL
COMMUNITY
Start: 2025-02-17

## 2025-03-29 RX ORDER — AMLODIPINE BESYLATE 5 MG/1
5 TABLET ORAL DAILY
COMMUNITY

## 2025-03-29 RX ORDER — ASPIRIN 81 MG/1
81 TABLET, CHEWABLE ORAL DAILY
COMMUNITY

## 2025-03-29 RX ORDER — SITAGLIPTIN 25 MG/1
25 TABLET, FILM COATED ORAL DAILY
COMMUNITY

## 2025-03-29 ASSESSMENT — VISUAL ACUITY
METHOD: SNELLEN - LINEAR
OS_SC+: -2
OD_SC+: -2
OD_SC: 20/30
OS_SC: 20/50

## 2025-03-29 ASSESSMENT — REFRACTION_MANIFEST
OD_AXIS: 015
OS_CYLINDER: +1.75
OD_CYLINDER: +0.75
OD_ADD: +2.50
OS_ADD: +2.50
OS_AXIS: 170
OS_SPHERE: -1.00
OD_SPHERE: +0.50

## 2025-03-29 ASSESSMENT — CONF VISUAL FIELD
METHOD: COUNTING FINGERS
OD_INFERIOR_NASAL_RESTRICTION: 0
OS_SUPERIOR_NASAL_RESTRICTION: 0
OD_NORMAL: 1
OD_SUPERIOR_NASAL_RESTRICTION: 0
OS_INFERIOR_NASAL_RESTRICTION: 0
OD_INFERIOR_TEMPORAL_RESTRICTION: 0
OS_SUPERIOR_TEMPORAL_RESTRICTION: 0
OS_NORMAL: 1
OD_SUPERIOR_TEMPORAL_RESTRICTION: 0
OS_INFERIOR_TEMPORAL_RESTRICTION: 0

## 2025-03-29 ASSESSMENT — TONOMETRY
OS_IOP_MMHG: 12
IOP_METHOD: ICARE
OD_IOP_MMHG: 13

## 2025-03-29 ASSESSMENT — SLIT LAMP EXAM - LIDS
COMMENTS: MEIBOMIAN GLAND DYSFUNCTION, DERMATOCHALASIS
COMMENTS: MEIBOMIAN GLAND DYSFUNCTION, DERMATOCHALASIS

## 2025-03-29 ASSESSMENT — CUP TO DISC RATIO: OS_RATIO: 0.7

## 2025-03-29 ASSESSMENT — EXTERNAL EXAM - RIGHT EYE: OD_EXAM: NORMAL

## 2025-03-29 ASSESSMENT — EXTERNAL EXAM - LEFT EYE: OS_EXAM: NORMAL

## 2025-03-29 NOTE — PROGRESS NOTES
CC/HPI: Lucia Oliver is a 64 year old who presents for annual diabetes eye visit. Also she needs new prescription.     Most recent hemoglobin A1c was  6.4, patient denies any new visual symptoms.     POH: None     PMH:   Past Medical History:   Diagnosis Date    Asthma     Cataract     Diabetes mellitus type II     Essential hypertension, benign     High cholesterol     Numbness in both hands     Unspecified asthma(493.90)       Current Outpatient Medications   Medication Sig Dispense Refill    calcium carbonate-vitamin D (CALTRATE) 600-10 MG-MCG per tablet Take 1 tablet by mouth.      lovastatin (MEVACOR) 20 MG tablet Take 2 tablets by mouth daily.      alendronate (FOSAMAX) 70 MG tablet Take 70 mg by mouth every 7 days      amLODIPine (NORVASC) 5 MG tablet Take 5 mg by mouth daily.      amoxicillin (AMOXIL) 500 MG capsule Take by mouth 2 times daily      aspirin (ASA) 81 MG chewable tablet Take 81 mg by mouth daily.      beclomethasone (QVAR) 80 MCG/ACT AERS IS A DISCONTINUED MEDICATION Inhale 1 puff into the lungs 2 times daily.      capsaicin (ZOSTRIX) 0.025 % external cream Apply 1 g topically 3 times daily 120 g 11    ciprofloxacin (CILOXAN) 0.3 % ophthalmic solution Instill 3 drops pred forte, followed by 3 drops of ciloxan into right ear 2 times daily for 5 days 5 mL 0    econazole nitrate 1 % cream Apply  topically 2 times daily. 85 g 3    Efinaconazole 10 % SOLN Externally apply topically daily To toenails daily. 8 mL 11    glyBURIDE (DIABETA / MICRONASE) 5 MG tablet Take 5 mg by mouth daily (with breakfast).      JANUVIA 25 MG tablet Take 25 mg by mouth daily.      lisinopril (PRINIVIL,ZESTRIL) 20 MG tablet Take 20 mg by mouth daily.      metFORMIN (GLUCOPHAGE) 500 MG tablet Take 1,000 mg by mouth.      Ofloxacin (FLOXIN OTIC OT)       pirbuterol (MAXAIR) 200 MCG/INH Inhaler Inhale 2 puffs into the lungs 4 times daily.      prednisoLONE acetate (PRED FORTE) 1 % ophthalmic suspension Instill 3 drops pred  forte, followed by 3 drops of ciloxan into right ear 2 times daily for 5 days 5 mL 0    propylene glycol (SYSTANE BALANCE) 0.6 % SOLN ophthalmic solution Place 1 drop into both eyes 4 times daily as needed for dry eyes 15 mL 11    simvastatin (ZOCOR) 20 MG tablet Take  by mouth At Bedtime.      urea (GORMEL) 20 % external cream Apply topically as needed. To feet calluses. 85 g 5    Urea 40 % CREA Externally apply topically daily 85 g 11     No current facility-administered medications for this visit.     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  She is also has myopia which is probably related to her cataracts in the left eye.   I also gave her a new prescription for glasses.     OCT RNFL was normal and symmetric.     She also has an inferior nasal fornix lesion, pigmented, with possible cysts in the left eye.  I did not see any signs of malignancy.     RTC  1 year    Attending Physician Attestation: Complete documentation of historical and exam elements from today's encounter can be found in the full encounter summary report (not reduplicated in this progress note). I personally obtained the chief complaint(s) and history of present illness. I confirmed and edited as necessary the review of systems, past medical/surgical history, family history, social history, and examination findings as documented by others; and I examined the patient myself. I personally reviewed the relevant tests, images, and reports as documented above. I formulated and edited as necessary the assessment and plan and discussed the findings and management plan with the patient and family.   Lux  Flores Hidalgo MD

## 2025-03-29 NOTE — LETTER
3/29/2025       RE: Lucia Oliver  [address]  [facility] [address]     Dear Colleague,    Thank you for referring your patient, Lucia Oliver, to the [facility] [place] [facility]. Please see a copy of my visit note below.    CC/HPI: Lucia Oliver is a 64 year old who presents for annual diabetes eye visit. Also she needs new prescription.     Most recent hemoglobin A1c was  6.4, patient denies any new visual symptoms.     POH: None     PMH:   Past Medical History:   Diagnosis Date     Asthma      Cataract      Diabetes mellitus type II      Essential hypertension, benign      High cholesterol      Numbness in both hands      Unspecified asthma(493.90)       Current Outpatient Medications   Medication Sig Dispense Refill     calcium carbonate-vitamin D (CALTRATE) 600-10 MG-MCG per tablet Take 1 tablet by mouth.       lovastatin (MEVACOR) 20 MG tablet Take 2 tablets by mouth daily.       alendronate (FOSAMAX) 70 MG tablet Take 70 mg by mouth every 7 days       amLODIPine (NORVASC) 5 MG tablet Take 5 mg by mouth daily.       amoxicillin (AMOXIL) 500 MG capsule Take by mouth 2 times daily       aspirin (ASA) 81 MG chewable tablet Take 81 mg by mouth daily.       beclomethasone (QVAR) 80 MCG/ACT AERS IS A DISCONTINUED MEDICATION Inhale 1 puff into the lungs 2 times daily.       capsaicin (ZOSTRIX) 0.025 % external cream Apply 1 g topically 3 times daily 120 g 11     ciprofloxacin (CILOXAN) 0.3 % ophthalmic solution Instill 3 drops pred forte, followed by 3 drops of ciloxan into right ear 2 times daily for 5 days 5 mL 0     econazole nitrate 1 % cream Apply  topically 2 times daily. 85 g 3     Efinaconazole 10 % SOLN Externally apply topically daily To toenails daily. 8 mL 11     glyBURIDE (DIABETA / MICRONASE) 5 MG tablet Take 5 mg by mouth daily (with breakfast).       JANUVIA 25 MG tablet Take 25 mg by mouth daily.       lisinopril (PRINIVIL,ZESTRIL)  20 MG tablet Take 20 mg by mouth daily.       metFORMIN (GLUCOPHAGE) 500 MG tablet Take 1,000 mg by mouth.       Ofloxacin (FLOXIN OTIC OT)        pirbuterol (MAXAIR) 200 MCG/INH Inhaler Inhale 2 puffs into the lungs 4 times daily.       prednisoLONE acetate (PRED FORTE) 1 % ophthalmic suspension Instill 3 drops pred forte, followed by 3 drops of ciloxan into right ear 2 times daily for 5 days 5 mL 0     propylene glycol (SYSTANE BALANCE) 0.6 % SOLN ophthalmic solution Place 1 drop into both eyes 4 times daily as needed for dry eyes 15 mL 11     simvastatin (ZOCOR) 20 MG tablet Take  by mouth At Bedtime.       urea (GORMEL) 20 % external cream Apply topically as needed. To feet calluses. 85 g 5     Urea 40 % CREA Externally apply topically daily 85 g 11     No current facility-administered medications for this visit.     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  She is also has myopia which is probably related to her cataracts in the left eye.     She also has an inferior nasal fornix lesion, pigmented, with possible cysts in the left eye.  I did not see any signs of malignancy.     RTC  1 year    Attending Physician Attestation: Complete documentation of historical and exam elements from today's encounter can be found in the full encounter summary report (not reduplicated in this progress note). I personally obtained the chief complaint(s) and history of present illness. I confirmed and edited as necessary the review of systems, past medical/surgical history, family history, social history, and examination findings as documented by others; and I examined the patient  myself. I personally reviewed the relevant tests, images, and reports as documented above. I formulated and edited as necessary the assessment and plan and discussed the findings and management plan with the patient and family.   Lux Hidalgo MD       Again, thank you for allowing me to participate in the care of your patient.      Dx: No sign of diabetic retinopathy.     Sincerely,    Lux Hidalgo MD